# Patient Record
Sex: FEMALE | Race: WHITE
[De-identification: names, ages, dates, MRNs, and addresses within clinical notes are randomized per-mention and may not be internally consistent; named-entity substitution may affect disease eponyms.]

---

## 2017-02-14 ENCOUNTER — HOSPITAL ENCOUNTER (INPATIENT)
Dept: HOSPITAL 61 - ER | Age: 37
LOS: 3 days | Discharge: HOME | DRG: 603 | End: 2017-02-17
Attending: INTERNAL MEDICINE | Admitting: INTERNAL MEDICINE
Payer: COMMERCIAL

## 2017-02-14 VITALS — BODY MASS INDEX: 34.56 KG/M2 | HEIGHT: 65 IN | WEIGHT: 207.44 LBS

## 2017-02-14 VITALS — SYSTOLIC BLOOD PRESSURE: 144 MMHG | DIASTOLIC BLOOD PRESSURE: 89 MMHG

## 2017-02-14 VITALS — DIASTOLIC BLOOD PRESSURE: 89 MMHG | SYSTOLIC BLOOD PRESSURE: 144 MMHG

## 2017-02-14 DIAGNOSIS — K31.84: ICD-10-CM

## 2017-02-14 DIAGNOSIS — Z88.2: ICD-10-CM

## 2017-02-14 DIAGNOSIS — E11.43: ICD-10-CM

## 2017-02-14 DIAGNOSIS — Z88.8: ICD-10-CM

## 2017-02-14 DIAGNOSIS — E87.6: ICD-10-CM

## 2017-02-14 DIAGNOSIS — A46: ICD-10-CM

## 2017-02-14 DIAGNOSIS — Z88.0: ICD-10-CM

## 2017-02-14 DIAGNOSIS — L03.211: Primary | ICD-10-CM

## 2017-02-14 DIAGNOSIS — D72.829: ICD-10-CM

## 2017-02-14 DIAGNOSIS — Z82.49: ICD-10-CM

## 2017-02-14 DIAGNOSIS — I10: ICD-10-CM

## 2017-02-14 DIAGNOSIS — Z91.018: ICD-10-CM

## 2017-02-14 LAB
ANION GAP SERPL CALC-SCNC: 14 MMOL/L (ref 6–14)
BASOPHILS # BLD AUTO: 0 X10^3/UL (ref 0–0.2)
BASOPHILS NFR BLD: 0 % (ref 0–3)
BUN SERPL-MCNC: 12 MG/DL (ref 7–20)
CALCIUM SERPL-MCNC: 9.2 MG/DL (ref 8.5–10.1)
CHLORIDE SERPL-SCNC: 97 MMOL/L (ref 98–107)
CO2 SERPL-SCNC: 26 MMOL/L (ref 21–32)
CREAT SERPL-MCNC: 0.8 MG/DL (ref 0.6–1)
EOSINOPHIL NFR BLD: 2 % (ref 0–3)
ERYTHROCYTE [DISTWIDTH] IN BLOOD BY AUTOMATED COUNT: 13 % (ref 11.5–14.5)
GFR SERPLBLD BASED ON 1.73 SQ M-ARVRAT: 80.3 ML/MIN
GLUCOSE SERPL-MCNC: 133 MG/DL (ref 70–99)
HCT VFR BLD CALC: 43.6 % (ref 36–47)
HGB BLD-MCNC: 14.3 G/DL (ref 12–15.5)
LYMPHOCYTES # BLD: 2.7 X10^3/UL (ref 1–4.8)
LYMPHOCYTES NFR BLD AUTO: 24 % (ref 24–48)
MCH RBC QN AUTO: 27 PG (ref 25–35)
MCHC RBC AUTO-ENTMCNC: 33 G/DL (ref 31–37)
MCV RBC AUTO: 83 FL (ref 79–100)
MONOCYTES NFR BLD: 6 % (ref 0–9)
NEUTROPHILS NFR BLD AUTO: 69 % (ref 31–73)
PLATELET # BLD AUTO: 389 X10^3/UL (ref 140–400)
POTASSIUM SERPL-SCNC: 3.2 MMOL/L (ref 3.5–5.1)
RBC # BLD AUTO: 5.28 X10^6/UL (ref 3.5–5.4)
SODIUM SERPL-SCNC: 137 MMOL/L (ref 136–145)
WBC # BLD AUTO: 11.4 X10^3/UL (ref 4–11)

## 2017-02-14 PROCEDURE — A9585 GADOBUTROL INJECTION: HCPCS

## 2017-02-14 PROCEDURE — 36415 COLL VENOUS BLD VENIPUNCTURE: CPT

## 2017-02-14 PROCEDURE — 85027 COMPLETE CBC AUTOMATED: CPT

## 2017-02-14 PROCEDURE — 80048 BASIC METABOLIC PNL TOTAL CA: CPT

## 2017-02-14 PROCEDURE — 85651 RBC SED RATE NONAUTOMATED: CPT

## 2017-02-14 PROCEDURE — 82947 ASSAY GLUCOSE BLOOD QUANT: CPT

## 2017-02-14 PROCEDURE — 86141 C-REACTIVE PROTEIN HS: CPT

## 2017-02-14 PROCEDURE — 96365 THER/PROPH/DIAG IV INF INIT: CPT

## 2017-02-14 PROCEDURE — 70543 MRI ORBT/FAC/NCK W/O &W/DYE: CPT

## 2017-02-14 RX ADMIN — METFORMIN HYDROCHLORIDE SCH MG: 1000 TABLET, FILM COATED ORAL at 21:00

## 2017-02-14 RX ADMIN — MEROPENEM SCH MLS/HR: 500 INJECTION, POWDER, FOR SOLUTION INTRAVENOUS at 21:36

## 2017-02-14 RX ADMIN — GABAPENTIN SCH MG: 300 CAPSULE ORAL at 21:37

## 2017-02-14 RX ADMIN — HYDROXYZINE PAMOATE SCH MG: 25 CAPSULE ORAL at 21:37

## 2017-02-14 RX ADMIN — HYDROXYZINE PAMOATE SCH MG: 25 CAPSULE ORAL at 21:00

## 2017-02-14 NOTE — PDOC1
History and Physical


Date of Admission


Date of Admission


17





Identification/Chief Complaint


Chief Complaint


facial redness


Problems:  





Source


Source:  Chart review, Patient





History of Present Illness


History of Present Illness








Patient is a 36  year old female who presents with cellulitis  to ER for 16days.


Pt came to ER before ,for same reason, also fu with PCP dr. Dyson and Derm dr. Tadeo in the past 2 weeks. Was given keflex for 7days, then clinda, then 

rocephin IM, currently on doxy for 2 days, also took prednisone for 5days , 

felt good with it for 2 days.


Denies fever, chills, cough sob.


She was also started with gabapentin for 2 days, feels the facial pain is 

slightly better. However, she still feels pretty much pain, and redness never 

better, so came today. Also tried multiple local treatment wo improvement.


pt works in ER as nurse manager.





Past Medical History


Cardiovascular:  HTN


GI:  Other


Renal/:  Other


Endocrine:  Diabetes





Past Surgical History


Past Surgical History:  , Other





Family History


Family History:  Cancer, Hypertension





Social History


Smoke:  No


ALCOHOL:  none


Drugs:  None





Current Problem List


Problem List


 Problems


Medical Problems:


(1) Cellulitis


Status: Acute  











Current Medications


Current Medications





 Current Medications








 Medications


  (Trade)  Dose


 Ordered  Sig/Kerwin  Start Time


 Stop Time Status Last Admin


Dose Admin


 


 Sodium Chloride


  (Iv Sodium


 Chloride 0.9%


 1000ml Bag)  1,000 ml @ 


 100 mls/hr  1X  ONCE  17 19:00


 2/15/17 04:59  17 19:14


100 MLS/HR


 


 Vancomycin HCl


 1.75 gm/Sodium


 Chloride  500 ml @ 


 250 mls/hr  1X  ONCE  17 19:00


 17 20:59  17 19:12


250 MLS/HR











Allergies


Allergies





 Allergies








Coded Allergies Type Severity Reaction Last Updated Verified


 


  Sulfa (Sulfonamide Antibiotics) Allergy Intermediate  14 No


 


  piperacillin Allergy Intermediate HIVES 2/3/17 Yes


 


  strawberry Allergy Intermediate  14 No


 


  tazobactam Allergy Intermediate HIVES 2/3/17 Yes











ROS


Review of System


CONSTITUTIONAL:        No fever or chills


EYES:                          No recent changes


SKIN:               No rash or itching


CARDIOVASCULAR:     No chest pain, syncope, palpitations, or edema


RESPIRATORY:            No SOB or cough


GASTROINTESTINAL:    No nausea, vomiting or abdominal pain


NEUROLOGICAL:          No headaches or weakness


ENDOCRINE:               No cold or heat intolerance


GENITOURINARY:         No urgency or frequency of urination


MUSCULOSKELETAL:   No back pain or joint pain


LYMPHATICS:               No enlarged lymph nodes


PSYCHIATRIC:              No anxiety or depression





Physical Exam


Physical Exam


GEN.:    No apparent distress.  Alert and oriented.


HEENT:    Head is normocephalic, atraumatic


NECK:    Supple.  


LUNGS:    Clear to auscultation.


HEART:    RRR, S1, S2 present.  Peripheral pulses intact


ABDOMEN:    Soft, nontender.  Positive bowel sounds.


EXTREMITIES:    Without any cyanosis.    


NEUROLOGIC:     Normal speech, normal tone


PSYCHIATRIC:    Normal affect, normal mood.


SKIN:   middle face including the nose is bright erythematous, with tenderness. 

no exudate now, but said has it before.





Vitals


Vitals





 Vital Signs








  Date Time  Temp Pulse Resp B/P Pulse Ox O2 Delivery O2 Flow Rate FiO2


 


17 18:29  100 20 162/97 98 Room Air  











Labs


Labs





Laboratory Tests








Test


  17


19:00


 


White Blood Count


  11.4x10^3/uL


(4.0-11.0)


 


Red Blood Count


  5.28x10^6/uL


(3.50-5.40)


 


Hemoglobin


  14.3g/dL


(12.0-15.5)


 


Hematocrit


  43.6%


(36.0-47.0)


 


Mean Corpuscular Volume 83fL () 


 


Mean Corpuscular Hemoglobin 27pg (25-35) 


 


Mean Corpuscular Hemoglobin


Concent 33g/dL (31-37) 


 


 


Red Cell Distribution Width


  13.0%


(11.5-14.5)


 


Platelet Count


  389x10^3/uL


(140-400)


 


Neutrophils (%) (Auto) 69% (31-73) 


 


Lymphocytes (%) (Auto) 24% (24-48) 


 


Monocytes (%) (Auto) 6% (0-9) 


 


Eosinophils (%) (Auto) 2% (0-3) 


 


Basophils (%) (Auto) 0% (0-3) 


 


Neutrophils # (Auto)


  7.8x10^3uL


(1.8-7.7)


 


Lymphocytes # (Auto)


  2.7x10^3/uL


(1.0-4.8)


 


Monocytes # (Auto)


  0.7x10^3/uL


(0.0-1.1)


 


Eosinophils # (Auto)


  0.2x10^3/uL


(0.0-0.7)


 


Basophils # (Auto)


  0.0x10^3/uL


(0.0-0.2)








Laboratory Tests








Test


  17


19:00


 


White Blood Count


  11.4x10^3/uL


(4.0-11.0)


 


Red Blood Count


  5.28x10^6/uL


(3.50-5.40)


 


Hemoglobin


  14.3g/dL


(12.0-15.5)


 


Hematocrit


  43.6%


(36.0-47.0)


 


Mean Corpuscular Volume 83fL () 


 


Mean Corpuscular Hemoglobin 27pg (25-35) 


 


Mean Corpuscular Hemoglobin


Concent 33g/dL (31-37) 


 


 


Red Cell Distribution Width


  13.0%


(11.5-14.5)


 


Platelet Count


  389x10^3/uL


(140-400)


 


Neutrophils (%) (Auto) 69% (31-73) 


 


Lymphocytes (%) (Auto) 24% (24-48) 


 


Monocytes (%) (Auto) 6% (0-9) 


 


Eosinophils (%) (Auto) 2% (0-3) 


 


Basophils (%) (Auto) 0% (0-3) 


 


Neutrophils # (Auto)


  7.8x10^3uL


(1.8-7.7)


 


Lymphocytes # (Auto)


  2.7x10^3/uL


(1.0-4.8)


 


Monocytes # (Auto)


  0.7x10^3/uL


(0.0-1.1)


 


Eosinophils # (Auto)


  0.2x10^3/uL


(0.0-0.7)


 


Basophils # (Auto)


  0.0x10^3/uL


(0.0-0.2)











VTE Prophylaxis Ordered


VTE Prophylaxis Devices:  Yes


VTE Pharmacological Prophylaxi:  Yes





Assessment/Plan


Assessment/Plan








1. facial redness,   2/2 cellulitis/erysipelas


2. dm2


3. HTN


4. gastroparesis





 plan:


1. will get ID consult


2. will start with vanco and meropenum for now


3. cont home meds, SSI,slightly decrease lantus to 30u qhs


dvt ppx








RAMY SERRANO MD 2017 19:38

## 2017-02-14 NOTE — ACF
Admission Forms Criteria


                                                                               

          


                            CELLULITIS





Clinical Indications for Admission to Inpatient Care





                                                 (Place 'X' for any and all 

applicable criteria):





Admission is indicated for ANY ONE of the following(1)(2)(3)(4)(5):


[ ]I.   Limb-threatening infection


[ ]II.  High-risk comorbid condition as indicated by ANY ONE of the following:


        [ ]a)   Uncontrolled diabetes (eg, HbA1c greater than 10% (0.1))    


        [ ]b)   Cirrhosis


        [ ]c)   Neutropenia   


        [ ]d)   Asplenia     


        [ ]e)   Immunosuppression    


        [ ]f)    Symptomatic heart failure


[ ]III. Failure of outpatient therapy as indicated by ALL of the following:


        [ ]a)   Progression or no improvement after adequate trial (minimum of 

48 hours, with longer period


                 for stable lower extremity infection)


        [ ]b)   Adequate antibiotic regimen as indicated by use of ANY ONE of 

the following:


                 [ ]i)     First-generation cephalosporin (e.g., cephalexin)


                 [ ]ii)    Antistaphylococcal penicillin (e.g., dicloxacillin)


                 [ ]iii)   Penicillin-allergic patient regimen (clindamycin, 

extended-spectrum   fluoroquinolone, or doxycycline)


                 [ ]iv)   Resistant organism (eg, methicillin-resistant 

Staphylococcus aureus) regimen (6)


        [ ]c)   Outpatient intravenous therapy regimen is not appropriate due 

to ANY ONE of the following. (7)(8)(9)(10):


                 [ ]i)    It was tried and was not successful (eg, progression 

of infection). 


                 [ ]ii)   It is not available or cannot be arranged in a 

clinically appropriate time frame (e.g., the next day). 


                 [ ]iii)  Clinical presentation (eg, acuity of infection, 

rapidity of progression, confirmed or suspected bacteremia)


                         is judged to require ALL of the following:


                          [ ]1)    Immediate initiation of intravenous therapy (

eg, cannot wait for next day) 


                          [ ]2)    Intensity of patient monitoring and 

observation (eg, vital sign measurement,  checks for infection progression) 


                                    that cannot be provided at other   than 

inpatient level of care


[ ]IV.   Mental status changes


[ ]V.    Bacteremia


[ ]VI.   Hemodynamic instability


[ ]VII.  Suspected necrotizing soft tissue infection (e.g., gas in tissue)(11)(

12)


[ ]VIII.  Orbital infection (13)(14)


[ ]IX.    Associated surgical procedure (e.g., abscess drainage, debridement) 

not amenable to outpatient, emergency department, or observation care


[ ]X.     Cutaneous gangrene


[ ]XI.    High fever (temperature greater than 39.5 degrees C (103.1 degrees F) 

(oral)) not responsive to outpatient, 


          emergency department, or observation care therapy


[X]XIII.  Inpatient admission required rather than observation care (Also use 

Cellulitis: Observation Care as appropriate) because of ANY ONE of the following

:   


          [ ]a)   Periorbital or perineal infection that is severe or worsening


          [ ]b)   Severe pain requiring acute inpatient management


          [ ]c)   IV fluid to replace significant ongoing (e.g., for over 24 

hours) losses (greater than 3L/m2 per day)


          [ ]d)   Compartment syndrome monitoring (17)


          [ ]e)   Strict or protective (eg, laminar flow) isolation


          [ ]f)    Urgent debridement or skin grafting


          [ ]g)   Bone or joint debridement


          [ ]h)   Immediate inpatient surgery


          [X]i)    Other condition, treatment or monitoring requiring inpatient

  admission








Extended stay beyond goal length of stay may be needed for (1)(18):


 [ ]a)    Necrotizing soft tissue infection or fasciitis 


 [ ]b)    Gram-negative infection


 [ ]c)    Methicillin-resistant Staphylococcal aureus (MRSA) infection 


 [ ]d)    Peripheral venous insufficiency with cellulitis


 [ ]e)    Extensive edema


 [ ]f)     Sepsis or continued Hemodynamic instability


 [ ]g)    Continued high fever or mental status change


 [ ]h)    Bacteremia


 [ ]i)     Active serious comorbid conditions ( eg,  heart failure, renal 

insufficiency)            





       


                                             


The original [employer] content created by [employer] has been revised. 


The portions of the content which have been revised are identified through the 

use of italic text or in bold, and [employer] 


has neither reviewed nor approved the modified material. All other unmodified 

content is copyright  [employer] [facility]





Please see references footnoted in the original [facility] edition 

2016


Admission Criteria Met?:  Yes








JOSHUA GRAY Feb 14, 2017 23:17

## 2017-02-14 NOTE — PHYS DOC
Past Medical History


Past Medical History:  Diabetes-Type II, Hypertension


Additional Past Medical Histor:  Gastroparesis


Past Surgical History:  


Additional Past Surgical Histo:  Laparoscopy, D&C


Alcohol Use:  None


Drug Use:  None





Adult General


Chief Complaint


Chief Complaint:  CELLULITIS





HPI


HPI


This is a 36-year-old female with known history of diabetes that's had an 

ongoing facial cellulitis that has not resolved despite taking multiple 

antibiotics. Patient states she has finished a course of Keflex and Clindamycin 

as well as several IM injections of Rocephin. Patient additionally states she 

is on day 2 of a course of doxycycline but states the redness and pain to her 

face has worsened. She additionally was seen by dermatologist and had wound 

cultures that were negative for staph and showed only RBCs. She denies any 

fever or chills. She denies any nausea or vomiting. She denies any significant 

headache. She additionally states she recently had a CT of her maxillofacial 

area that did not demonstrate any significant findings other than her ongoing 

cellulitis.





Review of Systems


Review of Systems





Constitutional: Denies fever or chills []


Eyes: Denies change in visual acuity, redness, or eye pain []


HENT: Denies nasal congestion or sore throat []


Respiratory: Denies cough or shortness of breath []


Cardiovascular: No additional information not addressed in HPI []


GI: Denies abdominal pain, nausea, vomiting, bloody stools or diarrhea []


: Denies dysuria or hematuria []


Musculoskeletal: Denies back pain or joint pain []


Integument: Denies rash or skin lesions []


Neurologic: Denies headache, focal weakness or sensory changes []


Endocrine: Denies polyuria or polydipsia []





Allergies


Allergies





 Allergies








Coded Allergies Type Severity Reaction Last Updated Verified


 


  Sulfa (Sulfonamide Antibiotics) Allergy Intermediate  14 No


 


  piperacillin Allergy Intermediate HIVES 2/3/17 Yes


 


  strawberry Allergy Intermediate  14 No


 


  tazobactam Allergy Intermediate HIVES 2/3/17 Yes











Physical Exam


Physical Exam





Constitutional: Well developed, well nourished, no acute distress, non-toxic 

appearance. []


HENT: Normocephalic, large amount of redness and tenderness to palpation of the 

central portion of the face, bilateral external ears normal, oropharynx moist, 

no oral exudates, nose normal. []


Eyes: PERRLA, EOMI, conjunctiva normal, no discharge. [] 


Neck: Normal range of motion, no tenderness, supple, no stridor. [] 


Cardiovascular:Heart rate regular rhythm, no murmur []


Lungs & Thorax:  Bilateral breath sounds clear to auscultation []


Abdomen: Bowel sounds normal, soft, no tenderness, no masses, no pulsatile 

masses. [] 


Skin: Warm, dry, no erythema, no rash. [] 


Back: No tenderness, no CVA tenderness. [] 


Extremities: No tenderness, no cyanosis, no clubbing, ROM intact, no edema. [] 


Neurologic: Alert and oriented X 3, normal motor function, normal sensory 

function, no focal deficits noted. []


Psychologic: Affect normal, judgement normal, mood normal. []





Current Patient Data


Vital Signs





 Vital Signs








  Date Time  Temp Pulse Resp B/P Pulse Ox O2 Delivery O2 Flow Rate FiO2


 


17 18:29  100 20 162/97 98 Room Air  











EKG


EKG


[]





Radiology/Procedures


Radiology/Procedures


[]





Course & Med Decision Making


Course & Med Decision Making


Pertinent Labs and Imaging studies reviewed. (See chart for details)





This 28-year-old female with ongoing facial cellulitis will be admitted to the 

hospital with a infectious disease consult. Her bloodwork reveals a slightly 

elevated white count of 11 but no other acute findings. I ordered the patient a 

dose of IV vancomycin and discuss the need for admission with the hospitalist, 

Dr. Birmingham, who agreed to accept the patient with infectious disease consult. Her 

infection is consistent with erisepylas that is resistant to multiple oral 

antibiotics.





Dragon Disclaimer


Dragon Disclaimer


This electronic medical record was generated, in whole or in part, using a 

voice recognition dictation system.





Departure


Departure


Impression:  


 Primary Impression:  


 Facial cellulitis


Disposition:   ADMITTED AS INPATIENT


Admitting Physician:  Saray Birmingham


Condition:  STABLE


Referrals:  


BHARATH HART MD (PCP)








RAQUEL SPRINGER DO 2017 19:34

## 2017-02-15 VITALS — DIASTOLIC BLOOD PRESSURE: 79 MMHG | SYSTOLIC BLOOD PRESSURE: 139 MMHG

## 2017-02-15 VITALS — DIASTOLIC BLOOD PRESSURE: 98 MMHG | SYSTOLIC BLOOD PRESSURE: 124 MMHG

## 2017-02-15 VITALS — DIASTOLIC BLOOD PRESSURE: 85 MMHG | SYSTOLIC BLOOD PRESSURE: 124 MMHG

## 2017-02-15 VITALS — SYSTOLIC BLOOD PRESSURE: 133 MMHG | DIASTOLIC BLOOD PRESSURE: 88 MMHG

## 2017-02-15 VITALS — DIASTOLIC BLOOD PRESSURE: 77 MMHG | SYSTOLIC BLOOD PRESSURE: 137 MMHG

## 2017-02-15 VITALS — SYSTOLIC BLOOD PRESSURE: 134 MMHG | DIASTOLIC BLOOD PRESSURE: 91 MMHG

## 2017-02-15 LAB
ANION GAP SERPL CALC-SCNC: 12 MMOL/L (ref 6–14)
BASOPHILS # BLD AUTO: 0.1 X10^3/UL (ref 0–0.2)
BASOPHILS NFR BLD: 1 % (ref 0–3)
BUN SERPL-MCNC: 9 MG/DL (ref 7–20)
CALCIUM SERPL-MCNC: 8.4 MG/DL (ref 8.5–10.1)
CHLORIDE SERPL-SCNC: 103 MMOL/L (ref 98–107)
CO2 SERPL-SCNC: 25 MMOL/L (ref 21–32)
CREAT SERPL-MCNC: 0.7 MG/DL (ref 0.6–1)
EOSINOPHIL NFR BLD: 2 % (ref 0–3)
ERYTHROCYTE [DISTWIDTH] IN BLOOD BY AUTOMATED COUNT: 13.3 % (ref 11.5–14.5)
GFR SERPLBLD BASED ON 1.73 SQ M-ARVRAT: 93.6 ML/MIN
GLUCOSE SERPL-MCNC: 109 MG/DL (ref 70–99)
HCT VFR BLD CALC: 38.1 % (ref 36–47)
HGB BLD-MCNC: 12.7 G/DL (ref 12–15.5)
LYMPHOCYTES # BLD: 3 X10^3/UL (ref 1–4.8)
LYMPHOCYTES NFR BLD AUTO: 29 % (ref 24–48)
MCH RBC QN AUTO: 28 PG (ref 25–35)
MCHC RBC AUTO-ENTMCNC: 33 G/DL (ref 31–37)
MCV RBC AUTO: 83 FL (ref 79–100)
MONOCYTES NFR BLD: 8 % (ref 0–9)
NEUTROPHILS NFR BLD AUTO: 60 % (ref 31–73)
PLATELET # BLD AUTO: 319 X10^3/UL (ref 140–400)
POTASSIUM SERPL-SCNC: 3 MMOL/L (ref 3.5–5.1)
RBC # BLD AUTO: 4.61 X10^6/UL (ref 3.5–5.4)
SODIUM SERPL-SCNC: 140 MMOL/L (ref 136–145)
WBC # BLD AUTO: 10.4 X10^3/UL (ref 4–11)

## 2017-02-15 RX ADMIN — METFORMIN HYDROCHLORIDE SCH MG: 1000 TABLET, FILM COATED ORAL at 17:47

## 2017-02-15 RX ADMIN — HYDROXYZINE PAMOATE SCH MG: 25 CAPSULE ORAL at 19:53

## 2017-02-15 RX ADMIN — METFORMIN HYDROCHLORIDE SCH MG: 1000 TABLET, FILM COATED ORAL at 08:50

## 2017-02-15 RX ADMIN — MEROPENEM SCH MLS/HR: 500 INJECTION, POWDER, FOR SOLUTION INTRAVENOUS at 23:11

## 2017-02-15 RX ADMIN — BACITRACIN SCH MLS/HR: 5000 INJECTION, POWDER, FOR SOLUTION INTRAMUSCULAR at 05:35

## 2017-02-15 RX ADMIN — MEROPENEM SCH MLS/HR: 500 INJECTION, POWDER, FOR SOLUTION INTRAVENOUS at 05:25

## 2017-02-15 RX ADMIN — INSULIN ASPART SCH UNITS: 100 INJECTION, SOLUTION INTRAVENOUS; SUBCUTANEOUS at 12:00

## 2017-02-15 RX ADMIN — MEROPENEM SCH MLS/HR: 500 INJECTION, POWDER, FOR SOLUTION INTRAVENOUS at 14:41

## 2017-02-15 RX ADMIN — FLUCONAZOLE SCH MG: 100 TABLET ORAL at 11:11

## 2017-02-15 RX ADMIN — Medication SCH MG: at 08:50

## 2017-02-15 RX ADMIN — GABAPENTIN SCH MG: 300 CAPSULE ORAL at 19:53

## 2017-02-15 RX ADMIN — INSULIN ASPART SCH UNITS: 100 INJECTION, SOLUTION INTRAVENOUS; SUBCUTANEOUS at 08:00

## 2017-02-15 RX ADMIN — BACITRACIN SCH MLS/HR: 5000 INJECTION, POWDER, FOR SOLUTION INTRAMUSCULAR at 05:29

## 2017-02-15 RX ADMIN — INSULIN ASPART SCH UNITS: 100 INJECTION, SOLUTION INTRAVENOUS; SUBCUTANEOUS at 17:00

## 2017-02-15 RX ADMIN — Medication SCH MG: at 17:47

## 2017-02-15 NOTE — PDOC
Infectious Disease Note


Vital Sign


Vital Signs





 Vital Signs








  Date Time  Temp Pulse Resp B/P Pulse Ox O2 Delivery O2 Flow Rate FiO2


 


2/15/17 08:55  80  133/88    


 


2/15/17 07:15 97.9  20  97 Room Air  





 97.9       











Labs


Lab





Laboratory Tests








Test


  2/14/17


19:00 2/14/17


21:02 2/15/17


04:16 2/15/17


07:40


 


White Blood Count


  11.4x10^3/uL


(4.0-11.0) 


  10.4x10^3/uL


(4.0-11.0) 


 


 


Red Blood Count


  5.28x10^6/uL


(3.50-5.40) 


  4.61x10^6/uL


(3.50-5.40) 


 


 


Hemoglobin


  14.3g/dL


(12.0-15.5) 


  12.7g/dL


(12.0-15.5) 


 


 


Hematocrit


  43.6%


(36.0-47.0) 


  38.1%


(36.0-47.0) 


 


 


Mean Corpuscular Volume 83fL ()   83fL ()  


 


Mean Corpuscular Hemoglobin 27pg (25-35)   28pg (25-35)  


 


Mean Corpuscular Hemoglobin


Concent 33g/dL (31-37) 


  


  33g/dL (31-37) 


  


 


 


Red Cell Distribution Width


  13.0%


(11.5-14.5) 


  13.3%


(11.5-14.5) 


 


 


Platelet Count


  389x10^3/uL


(140-400) 


  319x10^3/uL


(140-400) 


 


 


Neutrophils (%) (Auto) 69% (31-73)   60% (31-73)  


 


Lymphocytes (%) (Auto) 24% (24-48)   29% (24-48)  


 


Monocytes (%) (Auto) 6% (0-9)   8% (0-9)  


 


Eosinophils (%) (Auto) 2% (0-3)   2% (0-3)  


 


Basophils (%) (Auto) 0% (0-3)   1% (0-3)  


 


Neutrophils # (Auto)


  7.8x10^3uL


(1.8-7.7) 


  6.2x10^3uL


(1.8-7.7) 


 


 


Lymphocytes # (Auto)


  2.7x10^3/uL


(1.0-4.8) 


  3.0x10^3/uL


(1.0-4.8) 


 


 


Monocytes # (Auto)


  0.7x10^3/uL


(0.0-1.1) 


  0.8x10^3/uL


(0.0-1.1) 


 


 


Eosinophils # (Auto)


  0.2x10^3/uL


(0.0-0.7) 


  0.2x10^3/uL


(0.0-0.7) 


 


 


Basophils # (Auto)


  0.0x10^3/uL


(0.0-0.2) 


  0.1x10^3/uL


(0.0-0.2) 


 


 


Sodium Level


  137mmol/L


(136-145) 


  140mmol/L


(136-145) 


 


 


Potassium Level


  3.2mmol/L


(3.5-5.1) 


  3.0mmol/L


(3.5-5.1) 


 


 


Chloride Level


  97mmol/L


() 


  103mmol/L


() 


 


 


Carbon Dioxide Level


  26mmol/L


(21-32) 


  25mmol/L


(21-32) 


 


 


Anion Gap 14 (6-14)   12 (6-14)  


 


Blood Urea Nitrogen 12mg/dL (7-20)   9mg/dL (7-20)  


 


Creatinine


  0.8mg/dL


(0.6-1.0) 


  0.7mg/dL


(0.6-1.0) 


 


 


Estimated GFR


(Cockcroft-Gault) 80.3 


  


  93.6 


  


 


 


Glucose Level


  133mg/dL


(70-99) 


  109mg/dL


(70-99) 


 


 


Calcium Level


  9.2mg/dL


(8.5-10.1) 


  8.4mg/dL


(8.5-10.1) 


 


 


Glucose (Fingerstick)


  


  77mg/dL


(70-99) 


  99mg/dL


(70-99)











Objective


Assessment





Facial cellulitis


Leukocytosis


DM





Plan


Plan of Care


meropenem and doxy for now


Rheumatology consult to rule out Lupus








AGGIE ORELLANA MD Feb 15, 2017 10:19

## 2017-02-16 VITALS — DIASTOLIC BLOOD PRESSURE: 84 MMHG | SYSTOLIC BLOOD PRESSURE: 120 MMHG

## 2017-02-16 VITALS — SYSTOLIC BLOOD PRESSURE: 109 MMHG | DIASTOLIC BLOOD PRESSURE: 82 MMHG

## 2017-02-16 VITALS — SYSTOLIC BLOOD PRESSURE: 125 MMHG | DIASTOLIC BLOOD PRESSURE: 78 MMHG

## 2017-02-16 VITALS — SYSTOLIC BLOOD PRESSURE: 138 MMHG | DIASTOLIC BLOOD PRESSURE: 93 MMHG

## 2017-02-16 VITALS — DIASTOLIC BLOOD PRESSURE: 91 MMHG | SYSTOLIC BLOOD PRESSURE: 144 MMHG

## 2017-02-16 VITALS — SYSTOLIC BLOOD PRESSURE: 145 MMHG | DIASTOLIC BLOOD PRESSURE: 90 MMHG

## 2017-02-16 RX ADMIN — PREDNISONE SCH MG: 20 TABLET ORAL at 10:26

## 2017-02-16 RX ADMIN — GABAPENTIN SCH MG: 300 CAPSULE ORAL at 21:07

## 2017-02-16 RX ADMIN — MEROPENEM SCH MLS/HR: 500 INJECTION, POWDER, FOR SOLUTION INTRAVENOUS at 21:06

## 2017-02-16 RX ADMIN — METFORMIN HYDROCHLORIDE SCH MG: 1000 TABLET, FILM COATED ORAL at 17:11

## 2017-02-16 RX ADMIN — METOPROLOL TARTRATE SCH MG: 50 TABLET, FILM COATED ORAL at 05:42

## 2017-02-16 RX ADMIN — Medication SCH MG: at 05:00

## 2017-02-16 RX ADMIN — Medication SCH MG: at 17:12

## 2017-02-16 RX ADMIN — HYDROCHLOROTHIAZIDE SCH MG: 25 TABLET ORAL at 05:41

## 2017-02-16 RX ADMIN — HYDROCODONE BITARTRATE AND ACETAMINOPHEN PRN TAB: 5; 325 TABLET ORAL at 21:08

## 2017-02-16 RX ADMIN — Medication SCH MG: at 05:50

## 2017-02-16 RX ADMIN — HYDROCODONE BITARTRATE AND ACETAMINOPHEN PRN TAB: 5; 325 TABLET ORAL at 08:21

## 2017-02-16 RX ADMIN — INSULIN ASPART SCH UNITS: 100 INJECTION, SOLUTION INTRAVENOUS; SUBCUTANEOUS at 17:00

## 2017-02-16 RX ADMIN — INSULIN ASPART SCH UNITS: 100 INJECTION, SOLUTION INTRAVENOUS; SUBCUTANEOUS at 12:00

## 2017-02-16 RX ADMIN — HYDROXYZINE PAMOATE SCH MG: 25 CAPSULE ORAL at 17:11

## 2017-02-16 RX ADMIN — METFORMIN HYDROCHLORIDE SCH MG: 1000 TABLET, FILM COATED ORAL at 05:41

## 2017-02-16 RX ADMIN — MEROPENEM SCH MLS/HR: 500 INJECTION, POWDER, FOR SOLUTION INTRAVENOUS at 05:40

## 2017-02-16 RX ADMIN — INSULIN ASPART SCH UNITS: 100 INJECTION, SOLUTION INTRAVENOUS; SUBCUTANEOUS at 08:00

## 2017-02-16 RX ADMIN — MEROPENEM SCH MLS/HR: 500 INJECTION, POWDER, FOR SOLUTION INTRAVENOUS at 14:24

## 2017-02-16 RX ADMIN — Medication SCH EACH: at 05:00

## 2017-02-16 RX ADMIN — INSULIN DETEMIR SCH UNITS: 100 INJECTION, SOLUTION SUBCUTANEOUS at 08:19

## 2017-02-16 RX ADMIN — FLUCONAZOLE SCH MG: 100 TABLET ORAL at 08:15

## 2017-02-16 NOTE — RAD
PROCEDURE 

MRI of the face without and with contrast 2/16/2017 

 

HISTORY 

Facial pain, redness and swelling on cheeks and nose. Recurrent facial 

cellulitis. 

 

TECHNIQUE 

Unenhanced T1 weighted and fat saturated T2 weighted sagittal, axial and 

coronal images of the face and orbits were obtained. After the intravenous

administration 9 cc of Gadovist, enhanced T1 weighted fat saturated 

sagittal, axial and coronal images were obtained. 

 

FINDINGS 

Soft tissue swelling and increased signal intensity is seen within the 

subcutaneous fat on the T2 weighted images of the face inferior to the 

orbits, bilaterally along with the nose. These findings would be 

consistent with the patient's history of a cellulitis. No abnormal fluid 

collection is seen to suggest evidence of an abscess. The orbits are 

within normal limits. The paranasal sinuses are clear. No area of abnormal

signal intensity is seen involving the brain parenchyma. 

 

IMPRESSION 

Findings are seen consistent with the patient's history of a facial 

cellulitis. No abscess is seen. 

 

Electronically signed by: Marcos Pritchett MD (Feb 16, 2017 14:12:32)

## 2017-02-16 NOTE — PDOC
Infectious Disease Note


Subjective


Subjective


pt about same, no change in rash, some swelling and pain in to lips





ROS


ROS


GEN: Denies fevers, chills, sweats


HEENT: Denies blurred vision, sore throat


CV: Denies chest pain


RESP: Denies shortness of air, cough


GI: Denies n/v/d


NEURO: Denies confusion, dizziness


MSK: Denies weakness, joint pain/swelling





Vital Sign


Vital Signs





 Vital Signs








  Date Time  Temp Pulse Resp B/P Pulse Ox O2 Delivery O2 Flow Rate FiO2


 


2/16/17 08:21   16   Room Air  


 


2/16/17 07:00 97.9 74  120/84 97   





 97.9       











Physical Exam


PHYSICAL EXAM


GENERAL:  NAD, Alert


HEENT:  PERRL, OC/OP,, no change in rash


NECK:  Supple, no JVD, no LN


LUNGS:  Clear


HEART:  S1S2, no gallop, no murmur


ABD:  Soft, NT, no organomegaly, no rebound


EXT:  No edema, no cyanosis


CNS:  Alert, oriented x 3, no focal neurologic deficit


SKIN:  No rash


IV: ok





Labs


Lab





Laboratory Tests








Test


  2/15/17


11:30 2/15/17


16:40 2/15/17


21:05 2/16/17


08:11


 


Glucose (Fingerstick)


  167mg/dL


(70-99) 227mg/dL


(70-99) 174mg/dL


(70-99) 108mg/dL


(70-99)











Objective


Assessment





Facial cellulitis// likely has Lupus


Leukocytosis


DM





Plan


Plan of Care


meropenem and doxy for now





d/w dr Owen, check crp, sed rate, clive, and medrol dose pack


d/c AGGIE Ortez MD Feb 16, 2017 08:39

## 2017-02-16 NOTE — PDOC
PROGRESS NOTES


Chief Complaint


Chief Complaint


Facial cellulitis








ASSESSMENT AND PLAN:


1.  Facial erythema:  appreciate Dr Sherman's input.  Abx incl merrem and doxy 

started, but no clinical improvement.  MRI face with cellulitis only, no 

abscess.  D/W Dr Sherman:  suspect Lupus:  start steroids.   Rheum consult not 

available, but curbsided:  O/P F/U


2.  DM2:  welll controlled on home regimen.  cont ISS


3.  HTN:  fair control on home metoprolol.  not on ACE-I as diabetic?


4.  Diabetic gastroparesis:  reglan PRN


5.  Hypokalemia:  worse.  replete PO and IV


6.  Prophylaxis:  lovenox





Vitals


Vitals





 Vital Signs








  Date Time  Temp Pulse Resp B/P Pulse Ox O2 Delivery O2 Flow Rate FiO2


 


2/16/17 11:04 97.7 78 18 145/90 96 Room Air  





 97.7       











Physical Exam


General:  Alert, Oriented X3, Cooperative


Heart:  Regular rate


Lungs:  Clear


Abdomen:  Normal bowel sounds, Soft, No tenderness


Extremities:  No edema


Skin:  Other (erythema over malar pominences, nose and phyllum)





Labs


LABS





Laboratory Tests








Test


  2/15/17


16:40 2/15/17


21:05 2/16/17


08:11 2/16/17


09:10


 


Glucose (Fingerstick)


  227mg/dL


(70-99) 174mg/dL


(70-99) 108mg/dL


(70-99) 


 


 


Erythrocyte Sedimentation Rate    34 (0-25) 














Test


  2/16/17


10:55 


  


  


 


 


Glucose (Fingerstick)


  118mg/dL


(70-99) 


  


  


 











Review of Systems


Review of Systems


essentially unchanged, min worsening below nose.








STEPHANY LOUIE MD Feb 16, 2017 12:54

## 2017-02-17 VITALS — SYSTOLIC BLOOD PRESSURE: 125 MMHG | DIASTOLIC BLOOD PRESSURE: 86 MMHG

## 2017-02-17 VITALS — DIASTOLIC BLOOD PRESSURE: 72 MMHG | SYSTOLIC BLOOD PRESSURE: 120 MMHG

## 2017-02-17 VITALS — DIASTOLIC BLOOD PRESSURE: 91 MMHG | SYSTOLIC BLOOD PRESSURE: 122 MMHG

## 2017-02-17 VITALS — DIASTOLIC BLOOD PRESSURE: 84 MMHG | SYSTOLIC BLOOD PRESSURE: 119 MMHG

## 2017-02-17 VITALS
SYSTOLIC BLOOD PRESSURE: 128 MMHG | DIASTOLIC BLOOD PRESSURE: 89 MMHG | DIASTOLIC BLOOD PRESSURE: 89 MMHG | SYSTOLIC BLOOD PRESSURE: 128 MMHG

## 2017-02-17 RX ADMIN — Medication SCH MG: at 16:30

## 2017-02-17 RX ADMIN — HYDROCHLOROTHIAZIDE SCH MG: 25 TABLET ORAL at 04:57

## 2017-02-17 RX ADMIN — Medication SCH EACH: at 04:55

## 2017-02-17 RX ADMIN — HYDROXYZINE PAMOATE SCH MG: 25 CAPSULE ORAL at 17:00

## 2017-02-17 RX ADMIN — Medication SCH MG: at 04:55

## 2017-02-17 RX ADMIN — INSULIN ASPART SCH UNITS: 100 INJECTION, SOLUTION INTRAVENOUS; SUBCUTANEOUS at 07:51

## 2017-02-17 RX ADMIN — MEROPENEM SCH MLS/HR: 500 INJECTION, POWDER, FOR SOLUTION INTRAVENOUS at 05:01

## 2017-02-17 RX ADMIN — MEROPENEM SCH MLS/HR: 500 INJECTION, POWDER, FOR SOLUTION INTRAVENOUS at 12:29

## 2017-02-17 RX ADMIN — METFORMIN HYDROCHLORIDE SCH MG: 1000 TABLET, FILM COATED ORAL at 04:55

## 2017-02-17 RX ADMIN — PREDNISONE SCH MG: 20 TABLET ORAL at 08:33

## 2017-02-17 RX ADMIN — METOPROLOL TARTRATE SCH MG: 50 TABLET, FILM COATED ORAL at 04:54

## 2017-02-17 RX ADMIN — INSULIN ASPART SCH UNITS: 100 INJECTION, SOLUTION INTRAVENOUS; SUBCUTANEOUS at 11:24

## 2017-02-17 RX ADMIN — FLUCONAZOLE SCH MG: 100 TABLET ORAL at 08:33

## 2017-02-17 RX ADMIN — METFORMIN HYDROCHLORIDE SCH MG: 1000 TABLET, FILM COATED ORAL at 17:00

## 2017-02-17 RX ADMIN — Medication SCH MG: at 05:06

## 2017-02-17 RX ADMIN — INSULIN DETEMIR SCH UNITS: 100 INJECTION, SOLUTION SUBCUTANEOUS at 05:10

## 2017-02-17 RX ADMIN — INSULIN ASPART SCH UNITS: 100 INJECTION, SOLUTION INTRAVENOUS; SUBCUTANEOUS at 17:00

## 2017-02-17 NOTE — CONS
DATE OF CONSULTATION:  02/15/2017



REQUESTING PHYSICIAN:  Dr. Birmingham.



REASON FOR CONSULTATION:  Facial cellulitis.



HISTORY OF PRESENT ILLNESS:  This is a 36-year-old  female who has

developed facial cellulitis in November/December area where she got Keflex and

it responded and went away until it came back about 16 days ago and now she has

been going through the third or fourth round of antibiotics including Keflex,

Rocephin, clindamycin and doxycycline and with no improvement, hence she is

admitted.  The patient also has been to Dr. Tadeo and there was no help.  Lupus

has been ruled out by Dr. Dyson.  The patient is now started on vancomycin and

meropenem and consult has been requested.  The patient denies any fever, denies

any nausea, vomiting, diarrhea.  The patient had a CAT scan of the maxillofacial

done, which was negative.  The patient is diabetic with reasonably good control.

 The patient denies any ketoacidosis lately.



PAST MEDICAL HISTORY:  Positive for hypertension and diabetes, also has had

 in the past.



SOCIAL HISTORY:  Negative for smoking, alcohol, illicit drug use.



ALLERGIES:  LISTED AS ALLERGIC TO SULFA.  THE PATIENT IS LISTED ALSO AS

PIPERACILLIN AND TAZOBACTAM AS EVIDENTLY GIVING IT TO THE PATIENT, ZOSYN, IT

SPILLED ON HER ARM AND SHE HAD A REDNESS, ALTHOUGH THE PATIENT HAS TAKEN

PENICILLIN WITHOUT ANY PROBLEM.



CURRENT MEDICATIONS:  Reviewed.



REVIEW OF SYSTEMS:  As per HPI, all other systems reviewed are negative.



PHYSICAL EXAMINATION:

GENERAL:  Alert, oriented female, not in distress.

VITAL SIGNS:  Stable, afebrile.

HEENT:  NAD.

NECK:  Supple, no JVP, no lymphadenopathy.

LUNGS:  Clear.

HEART:  S1, S2 regular.

ABDOMEN:  Benign.

EXTREMITIES:  No edema or cyanosis.

SKIN:  The patient does have a plastic malar rash on the face.  There is some

induration to it.  There is no necrosis.  There is no pus pointing.  There is no

abscess and at least the nose what I can see through the flashlight is not

showing any ulcer or abscess.



The patient is neurologically intact.  There are no lesions in the mouth either.



LABORATORY DATA:  White count was 11.4 yesterday.  BUN and creatinine is normal.

 Urinalysis unremarkable.  She has had some cultures done, which I am not sure

was just from the skin.  It is negative.  The nose cultures were negative for

MRSA.



IMPRESSION:

1.  Facial cellulitis.  There is a classic appearance of lupus, although

evidently has been ruled out.

2.  Leukocytosis, most likely from steroids as she received a course of steroids

and in fact, it responded very quickly that it went away until steroid was gone

and in a few days, then it came back.

3.  Diabetes.

4.  Hypertension.



RECOMMENDATIONS:  I would continue meropenem, discontinue vancomycin, add

Diflucan.  The fact that she responded to steroids quickly, it may still be

lupus.  I would get Dr. Owen involved to see if seronegative lupus may be

there.  Also, diabetic patients can have Pseudomonas cellulitis, which will

target and the Mucor is a distant possibility, although I do not see any lesions

and the CT was negative.



Thank you very much, Dr. Birmingham, for giving me the opportunity to participate in

this patient's care.

 



______________________________

AGGIE ORELLANA MD



DR:  TERRI/bhavana  JOB#:  [id number] / [id number]

DD:  02/15/2017 10:24  DT:  2017 08:26

## 2017-02-17 NOTE — PDOC
PROGRESS NOTES


Chief Complaint


Chief Complaint


Facial cellulitis








ASSESSMENT AND PLAN:


1.  Facial erythema:  appreciate Dr Sherman's input.  Abx incl merrem and doxy 

started, but no clinical improvement.  MRI face with cellulitis only, no 

abscess.  D/W Dr Sherman:  suspect Lupus:  start steroids.   Rheum consult not 

available, but curbsided:  O/P F/U


2.  DM2:  welll controlled on home regimen.  cont ISS


3.  HTN:  fair control on home metoprolol.  not on ACE-I as diabetic?


4.  Diabetic gastroparesis:  reglan PRN


5.  Hypokalemia:  worse.  replete PO and IV


6.  Prophylaxis:  lovenox





Vitals


Vitals





 Vital Signs








  Date Time  Temp Pulse Resp B/P Pulse Ox O2 Delivery O2 Flow Rate FiO2


 


2/17/17 11:15 97.5 90 20 122/91 98 Room Air  





 97.5       











Physical Exam


General:  Alert, Oriented X3, Cooperative


Heart:  Regular rate


Lungs:  Clear


Abdomen:  Normal bowel sounds, Soft, No tenderness


Extremities:  No edema


Skin:  Other (erythema over malar pominences, nose and phyllum)





Labs


LABS





Laboratory Tests








Test


  2/16/17


16:44 2/16/17


21:08 2/17/17


04:56 2/17/17


11:19


 


Glucose (Fingerstick)


  236mg/dL


(70-99) 167mg/dL


(70-99) 93mg/dL


(70-99) 132mg/dL


(70-99)











Review of Systems


Review of Systems


some improvement in erythema, edema resolved





Comment


Review of Relevant


I have reviewed the following items elise (where applicable) has been applied.


Labs





Laboratory Tests








Test


  2/15/17


16:40 2/15/17


21:05 2/16/17


08:11 2/16/17


09:10


 


Glucose (Fingerstick)


  227mg/dL


(70-99) 174mg/dL


(70-99) 108mg/dL


(70-99) 


 


 


Erythrocyte Sedimentation Rate    34 (0-25) 


 


C-Reactive Protein High


Sensitivity 


  


  


  18.96mg/L


(0.00-3.00)














Test


  2/16/17


10:55 2/16/17


16:44 2/16/17


21:08 2/17/17


04:56


 


Glucose (Fingerstick)


  118mg/dL


(70-99) 236mg/dL


(70-99) 167mg/dL


(70-99) 93mg/dL


(70-99)














Test


  2/17/17


11:19 


  


  


 


 


Glucose (Fingerstick)


  132mg/dL


(70-99) 


  


  


 








Laboratory Tests








Test


  2/16/17


16:44 2/16/17


21:08 2/17/17


04:56 2/17/17


11:19


 


Glucose (Fingerstick)


  236mg/dL


(70-99) 167mg/dL


(70-99) 93mg/dL


(70-99) 132mg/dL


(70-99)








Medications





 Current Medications


Vancomycin HCl 1.75 gm/Sodium Chloride 500 ml @  250 mls/hr 1X  ONCE IV  Last 

administered on 2/14/17at 19:12;  Start 2/14/17 at 19:00;  Stop 2/14/17 at 20:59

;  Status DC


Sodium Chloride (Iv Sodium Chloride 0.9% 1000ml Bag) 1,000 ml @  100 mls/hr 1X  

ONCE IV  Last administered on 2/14/17at 19:14;  Start 2/14/17 at 19:00;  Stop 2/

15/17 at 11:21;  Status DC


Ondansetron HCl 4 mg 4 mg PRN Q8HRS  PRN IV NAUSEA/VOMITING;  Start 2/14/17 at 

19:30;  Stop 2/15/17 at 10:59;  Status DC


Sodium Chloride (Iv Sodium Chloride 0.9% 1000ml Bag) 1,000 ml @  100 mls/hr 

Q10H IV  Last administered on 2/15/17at 05:35;  Start 2/14/17 at 19:29;  Stop 2/

15/17 at 11:21;  Status DC


Acetaminophen (Tylenol) 650 mg PRN Q4HRS  PRN PO FEVER Last administered on 2/15

/17at 05:34;  Start 2/14/17 at 19:30;  Stop 2/15/17 at 10:59;  Status DC


Acetaminophen (Tylenol) 650 mg PRN Q6HRS  PRN PO FEVER;  Start 2/14/17 at 19:45


Ondansetron HCl (Zofran) 4 mg PRN Q6HRS  PRN IV NAUSEA;  Start 2/14/17 at 19:45


Acetaminophen/ Hydrocodone Bitart (Lortab 5/325) 1 tab PRN Q6HRS  PRN PO PAIN 

MILD TO MOD Last administered on 2/16/17at 21:08;  Start 2/14/17 at 19:45


Insulin Aspart (Novolog) 0-9 UNITS TIDWMEALS SQ ;  Start 2/15/17 at 08:00


Dextrose 12.5 gm PRN Q15MIN  PRN IV SEE COMMENTS;  Start 2/14/17 at 19:45


Gabapentin (Neurontin) 300 mg HS PO  Last administered on 2/16/17at 21:07;  

Start 2/14/17 at 21:00


Metformin HCl (Glucophage) 1,000 mg BIDWMEALS PO  Last administered on 2/15/

17at 08:50;  Start 2/14/17 at 21:00;  Stop 2/15/17 at 11:00;  Status DC


Metoclopramide HCl (Reglan) 10 mg PRN TID  PRN PO INDIGESTION;  Start 2/14/17 

at 19:45


Metoprolol Tartrate (Lopressor) 50 mg DAILY PO  Last administered on 2/15/17at 

08:55;  Start 2/15/17 at 09:00;  Stop 2/15/17 at 11:01;  Status DC


Acarbose (Precose) 100 mg TIDWMEALS PO ;  Start 2/15/17 at 08:00;  Stop 2/15/17 

at 10:58;  Status DC


Acetaminophen/ Aspirin/Caffeine (Excedrin Migraine) 2 tab PRN BID  PRN PO PAIN;

  Start 2/14/17 at 19:45


Cetirizine HCl (Zyrtec) 10 mg PRN DAILY  PRN PO ALLERGIES Last administered on 2

/15/17at 02:18;  Start 2/15/17 at 02:15


Cyclobenzaprine HCl (Flexeril) 5 mg PRN TID  PRN PO MUSCLE PAIN;  Start 2/14/17 

at 20:00


Non-Formulary Medication 10 mg DAILY PO  Last administered on 2/15/17at 08:51;  

Start 2/15/17 at 09:00;  Stop 2/15/17 at 11:01;  Status DC


Glipizide (Glucotrol) 10 mg BIDBFRMEAL PO  Last administered on 2/17/17at 05:06

;  Start 2/15/17 at 07:30


Hydrochlorothiazide (Hydrodiuril) 25 mg DAILY PO  Last administered on 2/15/

17at 08:55;  Start 2/15/17 at 09:00;  Stop 2/15/17 at 11:00;  Status DC


Hydroxyzine Pamoate (Vistaril) 25 mg HS PO  Last administered on 2/14/17at 21:37

;  Start 2/14/17 at 20:00;  Stop 2/15/17 at 00:17;  Status DC


Non-Formulary Medication 1 each DAILY PO  Last administered on 2/15/17at 08:52;

  Start 2/15/17 at 09:00;  Stop 2/15/17 at 11:01;  Status DC


Non-Formulary Medication 10 mg PRN DAILY  PRN PO ALLERGIES;  Start 2/14/17 at 19

:45;  Status UNV


Insulin Detemir (Levemir) 30 units QHS SQ ;  Start 2/14/17 at 21:00;  Stop 2/15/

17 at 00:18;  Status DC


Vancomycin HCl 1 each 1 each PRN DAILY  PRN MC SEE COMMENTS Last administered 

on 2/14/17at 19:53;  Start 2/14/17 at 19:45;  Stop 2/15/17 at 10:12;  Status DC


Vancomycin HCl 1.5 gm/Sodium Chloride 500 ml @  250 mls/hr Q8H IV  Last 

administered on 2/15/17at 02:21;  Start 2/15/17 at 03:00;  Stop 2/15/17 at 10:12

;  Status DC


Meropenem/Sodium Chloride (Merrem/Iv Sodium Chloride 0.9% 50ml) 50 ml @  100 mls

/hr Q8HRS IV  Last administered on 2/17/17at 12:29;  Start 2/14/17 at 22:00


Potassium Chloride (Klor-Con) 40 meq 1X  ONCE PO  Last administered on 2/14/ 17at 21:37;  Start 2/14/17 at 20:00;  Stop 2/14/17 at 20:01;  Status DC


Vancomycin HCl 1 each 1X  ONCE MC ;  Start 2/15/17 at 18:30;  Stop 2/15/17 at 18

:30;  Status DC


Hydroxyzine Pamoate (Vistaril) 25 mg DAILYWSUP PO  Last administered on 2/16/ 17at 17:11;  Start 2/15/17 at 17:00


Insulin Detemir (Levemir) 30 units DAILY SQ ;  Start 2/15/17 at 09:00;  Stop 2/

15/17 at 10:58;  Status DC


Fluconazole (Diflucan) 400 mg DAILY PO  Last administered on 2/17/17at 08:33;  

Start 2/15/17 at 11:00


Insulin Detemir (Levemir) 40 units DAILY SQ  Last administered on 2/17/17at 05:

10;  Start 2/15/17 at 10:58


Hydrochlorothiazide (Hydrodiuril) 25 mg DAILY@05 PO  Last administered on 2/17/ 17at 04:57;  Start 2/16/17 at 05:00


Metformin HCl (Glucophage) 1,000 mg BID@05,17 PO  Last administered on 2/17/ 17at 04:55;  Start 2/15/17 at 17:00


Metoprolol Tartrate (Lopressor) 50 mg DAILY@05 PO  Last administered on 2/17/ 17at 04:54;  Start 2/16/17 at 05:00


Non-Formulary Medication 10 mg DAILY@05 PO  Last administered on 2/17/17at 04:55

;  Start 2/16/17 at 05:00


Non-Formulary Medication 1 each DAILY@05 PO  Last administered on 2/17/17at 04:

55;  Start 2/16/17 at 05:00


Prednisone (Prednisone) 40 mg DAILY PO  Last administered on 2/17/17at 08:33;  

Start 2/16/17 at 09:00


Gadobutrol (Gadavist) 9 mmol 1X  ONCE IV  Last administered on 2/16/17at 11:30;

  Start 2/16/17 at 11:30;  Stop 2/16/17 at 11:31;  Status DC





Active Scripts


Active


Hydrocodone-Apap 5-325  ** (Hydrocodone Bit/Acetaminophen) 1 Each Tablet 1 Tab 

PO PRN Q6HRS PRN


Prednisone 50 Mg Tablet 1 Tab PO DAILY


Clindamycin Hcl 150 Mg Capsule 3 Cap PO TID 10 Days


Reported


Farxiga (Dapagliflozin Propanediol) 10 Mg Tablet 10 Mg PO DAILY


Doxycycline Hyclate 100 Mg Capsule 1 Cap PO BID


Hydroxyzine Hcl 25 Mg Tablet 1-2 Tab PO HS


Glipizide 10 Mg Tablet 1 Tab PO BID


Hydrochlorothiazide Tablet (Hydrochlorothiazide) 50 Mg Tablet 25 Mg PO DAILY


Metoprolol Tartrate 50 Mg Tablet 1 Tab PO DAILY


Gabapentin 300 Mg Capsule 300 Mg PO HS


Ibuprofen 400 Mg Tablet 1 Tab PO PRN Q6HRS PRN


Zyrtec (Cetirizine Hcl) 10 Mg Capsule 10 Mg PO PRN DAILY PRN


Claritin (Loratadine) 10 Mg Tablet 10 Mg PO PRN DAILY PRN


Jolessa (Levonorgestrel-Eth Estradiol) 1 Each Tbdspk.3mo 1 Each PO DAILY


Analgesic Tablet (Aspirin/Caffeine) 1 Each Tablet 2 Each PO PRN BID PRN


Reglan (Metoclopramide Hcl) 10 Mg Tablet 1 Tab PO TID PRN


Cyclobenzaprine Hcl 5 Mg Tablet 5 Mg PO TID PRN


Lantus Solostar (Insulin Glargine,Hum.rec.anlog) 100 Unit/1 Ml Insuln.pen 40 

Unit SQ DAILY


Precose (Acarbose) 100 Mg Tablet 100 Mg PO TID


Metformin Hcl 1,000 Mg Tablet 1 Tab PO BID


Vitals/I & O





 Vital Sign - Last 24 Hours








 2/16/17 2/16/17 2/16/17 2/16/17





 19:00 21:08 22:08 23:00


 


Temp 98.5   98.4





 98.5   98.4


 


Pulse 102   102


 


Resp 18 18  18


 


B/P 144/91   138/93


 


Pulse Ox 93   97


 


O2 Delivery Room Air Room Air Room Air Room Air


 


    





    





 2/17/17 2/17/17 2/17/17 2/17/17





 03:00 04:52 04:54 07:15


 


Temp 98.3   98.1





 98.3   98.1


 


Pulse 84 85 85 79


 


Resp 18   18


 


B/P 120/72 119/84 119/84 125/86


 


Pulse Ox 97   96


 


O2 Delivery Room Air Room Air  Room Air


 


    





    





 2/17/17 2/17/17  





 08:00 11:15  


 


Temp  97.5  





  97.5  


 


Pulse  90  


 


Resp  20  


 


B/P  122/91  


 


Pulse Ox  98  


 


O2 Delivery Room Air Room Air  














 Intake and Output   


 


 2/16/17 2/16/17 2/17/17





 15:00 23:00 07:00


 


Intake Total 180 ml 700 ml 240 ml


 


Balance 180 ml 700 ml 240 ml














STEPHANY LOUIE MD Feb 17, 2017 15:40

## 2017-02-17 NOTE — PDOC
Infectious Disease Note


Subjective


Subjective


feeling much better, pain and redness improving





ROS


ROS


GEN: Denies fevers, chills, sweats


HEENT: Denies blurred vision, sore throat


CV: Denies chest pain


RESP: Denies shortness of air, cough


GI: Denies n/v/d


NEURO: Denies confusion, dizziness


MSK: Denies weakness, joint pain/swelling





Vital Sign


Vital Signs





 Vital Signs








  Date Time  Temp Pulse Resp B/P Pulse Ox O2 Delivery O2 Flow Rate FiO2


 


2/17/17 07:15 98.1 79 18 125/86 96 Room Air  





 98.1       











Physical Exam


PHYSICAL EXAM


GENERAL:  NAD, Alert


HEENT:  PERRL, OC/OP, malar rash less red


NECK:  Supple, no JVD, no LN


LUNGS:  Clear


HEART:  S1S2, no gallop, no murmur


ABD:  Soft, NT, no organomegaly, no rebound


EXT:  No edema, no cyanosis


CNS:  Alert, oriented x 3, no focal neurologic deficit


SKIN:  No rash


IV: ok





Labs


Lab





Laboratory Tests








Test


  2/16/17


10:55 2/16/17


16:44 2/16/17


21:08 2/17/17


04:56


 


Glucose (Fingerstick)


  118mg/dL


(70-99) 236mg/dL


(70-99) 167mg/dL


(70-99) 93mg/dL


(70-99)











Objective


Assessment





Facial cellulitis// likely has Lupus


Leukocytosis


DM





Plan


Plan of Care


d/c ok on po medrol dose pack


f/u dr Owen on monday/tuesday


d/c antibiotics








AGGIE ORELLANA MD Feb 17, 2017 09:38

## 2017-02-18 NOTE — DS
DATE OF DISCHARGE:  02/17/2017



CHIEF COMPLAINT:  Facial cellulitis.



HOSPITAL COURSE:  The patient is a 36-year-old  woman who was admitted

for IV treatment of what appeared to be facial cellulitis, not responding to 3

different courses of antibiotics in the past.  She was seen by Dr. Sherman from

Infectious Disease who suspected that the erythema was due to possibly lupus

rather than infection.  A trial of merrem was started without any clinical

improvement.  MRI of the face did show cellulitis only, no abscess.  She was

therefore started on steroids with improvement of her symptoms within 24 hours. 

Although she had had a preliminary workup for lupus in the past and had been

negative, Rheumatology was curbsided and recommended further testing.  The 
patient

will follow up on an outpatient basis to receive results and discuss further

approach.



DISCHARGE PHYSICAL EXAMINATION:  Please refer to the note from same date.



DISCHARGE DISPOSITION:  To home.



DISCHARGE CONDITION:  Improved.



DISCHARGE DIAGNOSIS:  facial cellulitis, suspicion for lupus.



DISCHARGE MEDICATIONS:  Please refer to MAR.



DISCHARGE INSTRUCTIONS:  The patient will follow up with PCP and see

Rheumatology in the coming week.

 



______________________________

STEPHANY LOUIE MD



DR:  UR/nts  JOB#:  [id number] / [id number]

DD:  02/18/2017 14:42  DT:  02/18/2017 23:48



BHARATH Martinez MD

MTDD

## 2017-06-05 ENCOUNTER — HOSPITAL ENCOUNTER (OUTPATIENT)
Dept: HOSPITAL 61 - LAB | Age: 37
Discharge: HOME | End: 2017-06-05
Attending: SURGERY
Payer: COMMERCIAL

## 2017-06-05 DIAGNOSIS — E11.9: ICD-10-CM

## 2017-06-05 DIAGNOSIS — Z01.818: Primary | ICD-10-CM

## 2017-06-05 DIAGNOSIS — E66.01: ICD-10-CM

## 2017-06-05 DIAGNOSIS — I10: ICD-10-CM

## 2017-06-05 LAB
ALBUMIN SERPL-MCNC: 3.3 G/DL (ref 3.4–5)
ALBUMIN/GLOB SERPL: 0.7 {RATIO} (ref 1–1.7)
ALP SERPL-CCNC: 55 U/L (ref 46–116)
ALT SERPL-CCNC: 23 U/L (ref 14–59)
ANION GAP SERPL CALC-SCNC: 10 MMOL/L (ref 6–14)
AST SERPL-CCNC: 17 U/L (ref 15–37)
BASOPHILS # BLD AUTO: 0.1 X10^3/UL (ref 0–0.2)
BASOPHILS NFR BLD: 1 % (ref 0–3)
BILIRUB SERPL-MCNC: 0.6 MG/DL (ref 0.2–1)
BUN SERPL-MCNC: 16 MG/DL (ref 7–20)
BUN/CREAT SERPL: 20 (ref 6–20)
CALCIUM SERPL-MCNC: 9.3 MG/DL (ref 8.5–10.1)
CHLORIDE SERPL-SCNC: 104 MMOL/L (ref 98–107)
CO2 SERPL-SCNC: 25 MMOL/L (ref 21–32)
CREAT SERPL-MCNC: 0.8 MG/DL (ref 0.6–1)
EOSINOPHIL NFR BLD: 2 % (ref 0–3)
ERYTHROCYTE [DISTWIDTH] IN BLOOD BY AUTOMATED COUNT: 13.4 % (ref 11.5–14.5)
GFR SERPLBLD BASED ON 1.73 SQ M-ARVRAT: 80.3 ML/MIN
GLOBULIN SER-MCNC: 4.6 G/DL (ref 2.2–3.8)
GLUCOSE SERPL-MCNC: 96 MG/DL (ref 70–99)
HCT VFR BLD CALC: 38.6 % (ref 36–47)
HGB BLD-MCNC: 12.8 G/DL (ref 12–15.5)
LYMPHOCYTES # BLD: 2.9 X10^3/UL (ref 1–4.8)
LYMPHOCYTES NFR BLD AUTO: 25 % (ref 24–48)
MCH RBC QN AUTO: 28 PG (ref 25–35)
MCHC RBC AUTO-ENTMCNC: 33 G/DL (ref 31–37)
MCV RBC AUTO: 84 FL (ref 79–100)
MONOCYTES NFR BLD: 6 % (ref 0–9)
NEUTROPHILS NFR BLD AUTO: 66 % (ref 31–73)
PLATELET # BLD AUTO: 338 X10^3/UL (ref 140–400)
POTASSIUM SERPL-SCNC: 3.9 MMOL/L (ref 3.5–5.1)
PROT SERPL-MCNC: 7.9 G/DL (ref 6.4–8.2)
RBC # BLD AUTO: 4.6 X10^6/UL (ref 3.5–5.4)
SODIUM SERPL-SCNC: 139 MMOL/L (ref 136–145)
WBC # BLD AUTO: 11.6 X10^3/UL (ref 4–11)

## 2017-06-05 PROCEDURE — 93005 ELECTROCARDIOGRAM TRACING: CPT

## 2017-06-05 PROCEDURE — 36415 COLL VENOUS BLD VENIPUNCTURE: CPT

## 2017-06-05 PROCEDURE — 71020: CPT

## 2017-06-05 PROCEDURE — 83036 HEMOGLOBIN GLYCOSYLATED A1C: CPT

## 2017-06-05 PROCEDURE — 85027 COMPLETE CBC AUTOMATED: CPT

## 2017-06-05 PROCEDURE — 80053 COMPREHEN METABOLIC PANEL: CPT

## 2017-06-05 NOTE — RAD
Chest, 2 views, 6/5/2017:



History: Preop evaluation for gastric surgery, morbid obesity



Comparison is made to a study from 11/29/2014. The heart size and pulmonary

vascularity are normal. The lungs are clear. There is no evidence of pleural

fluid.



IMPRESSION: No acute cardiopulmonary abnormality is detected.

## 2017-06-05 NOTE — EKG
General acute hospital

               [address] [place] [address]

Test Date:    2017               Test Time:    14:21:32

Pat Name:     MARIBETH JAIME       Department:   

Patient ID:   [id number]           Room:          

Gender:       F                        Technician:   

:          1980               Requested By: HILARIO MARTINEZ

Order Number: [id number]            Reading MD:     

                                 Measurements

Intervals                              Axis          

Rate:         81                       P:            29

LA:           130                      QRS:          18

QRSD:         86                       T:            20

QT:           366                                    

QTc:          426                                    

                           Interpretive Statements

SINUS RHYTHM

QRS(T) CONTOUR ABNORMALITY

CONSIDER ANTEROSEPTAL MYOCARDIAL DAMAGE

POSSIBLY ABNORMAL ECG

RI6.01

No previous ECG available for comparison

## 2017-06-23 ENCOUNTER — HOSPITAL ENCOUNTER (EMERGENCY)
Dept: HOSPITAL 61 - ER | Age: 37
Discharge: HOME | End: 2017-06-23
Payer: COMMERCIAL

## 2017-06-23 VITALS
SYSTOLIC BLOOD PRESSURE: 139 MMHG | DIASTOLIC BLOOD PRESSURE: 88 MMHG | SYSTOLIC BLOOD PRESSURE: 139 MMHG | DIASTOLIC BLOOD PRESSURE: 88 MMHG | DIASTOLIC BLOOD PRESSURE: 88 MMHG | SYSTOLIC BLOOD PRESSURE: 139 MMHG

## 2017-06-23 VITALS — HEIGHT: 65 IN | BODY MASS INDEX: 35.82 KG/M2 | WEIGHT: 215 LBS

## 2017-06-23 DIAGNOSIS — B35.9: ICD-10-CM

## 2017-06-23 DIAGNOSIS — L03.116: Primary | ICD-10-CM

## 2017-06-23 DIAGNOSIS — Z88.2: ICD-10-CM

## 2017-06-23 DIAGNOSIS — Z91.018: ICD-10-CM

## 2017-06-23 DIAGNOSIS — L55.9: ICD-10-CM

## 2017-06-23 DIAGNOSIS — E11.9: ICD-10-CM

## 2017-06-23 DIAGNOSIS — I10: ICD-10-CM

## 2017-06-23 DIAGNOSIS — Z88.1: ICD-10-CM

## 2017-06-23 PROCEDURE — 99283 EMERGENCY DEPT VISIT LOW MDM: CPT

## 2017-06-23 NOTE — PHYS DOC
Past Medical History


Past Medical History:  Diabetes-Type II, Hypertension


Additional Past Medical Histor:  dermatophytosis


Past Surgical History:  


Additional Past Surgical Histo:  D & C, laparoscopy


Alcohol Use:  Rarely


Drug Use:  None





Adult General


Chief Complaint


Chief Complaint:  WOUND CHECK





HPI


HPI





Patient is a 36  year old female with history of hypertension and diabetes type 

2 who presents today with infection on the top of the left foot. Patient states 

she got sunburned on the left foot 6 days ago. Patient states a blister formed 

over the sunburned area. She states the blister opened up 2 days ago. Patient 

states she noted the area has been draining yellow/greenish discharge. Patient 

denies any fever.





Review of Systems


Review of Systems





Constitutional: Denies fever or chills []


Eyes: Denies change in visual acuity, redness, or eye pain []


HENT: Denies nasal congestion or sore throat []


Musculoskeletal: Denies back pain or joint pain []


Integument: Left infection


Neurologic: Denies headache, focal weakness or sensory changes []


Endocrine: Denies polyuria or polydipsia []





Allergies


Allergies





Allergies








Coded Allergies Type Severity Reaction Last Updated Verified


 


  Sulfa (Sulfonamide Antibiotics) Allergy Intermediate  2/15/17 Yes


 


  piperacillin Allergy Intermediate HIVES 2/3/17 Yes


 


  strawberry Allergy Intermediate  2/15/17 Yes


 


  tazobactam Allergy Intermediate HIVES 2/3/17 Yes











Physical Exam


Physical Exam





Constitutional: Well developed, well nourished, no acute distress, non-toxic 

appearance. []


HENT: Normocephalic, atraumatic, bilateral external ears normal, oropharynx 

moist, no oral exudates, nose normal. []


Eyes: PERRLA, EOMI, conjunctiva normal, no discharge. [] 


Skin: Patient has moderate erythema to the left foot consistent with sunburn. 

There is a 3 x 3 open wound over the sunburned area of the left foot. No warmth 

to the area. +3 left pedal edema, +2 right pedal edema. Sunburned noted on the 

right foot.


Back: No tenderness, no CVA tenderness. [] 


Extremities: No tenderness, no cyanosis, no clubbing, ROM intact, no edema. [] 


Neurologic: Alert and oriented X 3, normal motor function, normal sensory 

function, no focal deficits noted. []


Psychologic: Affect normal, judgement normal, mood normal. []





Current Patient Data


Vital Signs





 Vital Signs








  Date Time  Temp Pulse Resp B/P (MAP) Pulse Ox O2 Delivery O2 Flow Rate FiO2


 


17 11:12 98.4 82 20  98 Room Air  





 98.4       











EKG


EKG


[]





Radiology/Procedures


Radiology/Procedures


[]





Course & Med Decision Making


Course & Med Decision Making


Pertinent Labs and Imaging studies reviewed. (See chart for details)





Patient is in the ED with sunburn to bilateral feet and infection on the top of 

the left foot. Tetanus is up-to-date. Discharged with clindamycin for 10 days. 

Follow-up with the wound clinic as soon as she can.





Dragon Disclaimer


Dragon Disclaimer


This electronic medical record was generated, in whole or in part, using a 

voice recognition dictation system.





Departure


Departure


Impression:  


 Primary Impression:  


 Cellulitis of left foot


 Additional Impression:  


 Sunburn


Disposition:   HOME, SELF-CARE


Condition:  STABLE


Referrals:  


BHARATH HART MD (PCP)


Follow-up with your primary care doctor in one week. 





Call [facility] today at  and set up a follow up 

appointment for wound care


Patient Instructions:  Cellulitis





Additional Instructions:  


You have left foot infection, take antibiotics prescribed as ordered. Come back 

to the ED at any point symptoms worsen or you develop a fever.


Call [facility] today at  and set up a follow up 

appointment for wound care


Complete your antibiotics


Scripts


Clindamycin Hcl (CLINDAMYCIN HCL) 150 Mg Capsule


3 CAP PO TID, #90 CAP


   Prov: SARIAH MONTOYA         17





Problem Qualifiers











SARIAH MONTOYA 2017 11:34

## 2017-06-26 ENCOUNTER — HOSPITAL ENCOUNTER (OUTPATIENT)
Dept: HOSPITAL 61 - PMGWOUND | Age: 37
Discharge: HOME | End: 2017-06-26
Attending: EMERGENCY MEDICINE
Payer: COMMERCIAL

## 2017-06-26 DIAGNOSIS — E66.01: ICD-10-CM

## 2017-06-26 DIAGNOSIS — E11.9: ICD-10-CM

## 2017-06-26 DIAGNOSIS — T25.022D: Primary | ICD-10-CM

## 2017-06-26 DIAGNOSIS — I10: ICD-10-CM
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
## 2017-06-26 PROCEDURE — 99204 OFFICE O/P NEW MOD 45 MIN: CPT

## 2017-06-26 PROCEDURE — 99214 OFFICE O/P EST MOD 30 MIN: CPT

## 2017-08-01 ENCOUNTER — HOSPITAL ENCOUNTER (OUTPATIENT)
Dept: HOSPITAL 61 - LAB | Age: 37
Discharge: HOME | End: 2017-08-01
Attending: INTERNAL MEDICINE
Payer: COMMERCIAL

## 2017-08-01 DIAGNOSIS — M25.50: Primary | ICD-10-CM

## 2017-08-01 PROCEDURE — 86140 C-REACTIVE PROTEIN: CPT

## 2017-08-01 PROCEDURE — 36415 COLL VENOUS BLD VENIPUNCTURE: CPT

## 2017-08-01 PROCEDURE — 85651 RBC SED RATE NONAUTOMATED: CPT

## 2018-01-26 ENCOUNTER — HOSPITAL ENCOUNTER (OUTPATIENT)
Dept: HOSPITAL 61 - LAB | Age: 38
Discharge: HOME | End: 2018-01-26
Attending: INTERNAL MEDICINE
Payer: COMMERCIAL

## 2018-01-26 DIAGNOSIS — M35.9: Primary | ICD-10-CM

## 2018-01-26 LAB
CRP SERPL-MCNC: 20.2 MG/L (ref 0–3.3)
SEDIMENTATION RATE: 27 (ref 0–25)

## 2018-01-26 PROCEDURE — 86140 C-REACTIVE PROTEIN: CPT

## 2018-01-26 PROCEDURE — 85651 RBC SED RATE NONAUTOMATED: CPT

## 2018-01-26 PROCEDURE — 36415 COLL VENOUS BLD VENIPUNCTURE: CPT

## 2018-03-02 ENCOUNTER — HOSPITAL ENCOUNTER (OUTPATIENT)
Dept: HOSPITAL 61 - SURG | Age: 38
End: 2018-03-02
Attending: ORTHOPAEDIC SURGERY
Payer: COMMERCIAL

## 2018-03-02 DIAGNOSIS — I10: ICD-10-CM

## 2018-03-02 DIAGNOSIS — Z88.8: ICD-10-CM

## 2018-03-02 DIAGNOSIS — Z87.440: ICD-10-CM

## 2018-03-02 DIAGNOSIS — Z87.442: ICD-10-CM

## 2018-03-02 DIAGNOSIS — E11.9: ICD-10-CM

## 2018-03-02 DIAGNOSIS — Z83.3: ICD-10-CM

## 2018-03-02 DIAGNOSIS — Z88.0: ICD-10-CM

## 2018-03-02 DIAGNOSIS — M19.90: ICD-10-CM

## 2018-03-02 DIAGNOSIS — Z88.2: ICD-10-CM

## 2018-03-02 DIAGNOSIS — Z88.1: ICD-10-CM

## 2018-03-02 DIAGNOSIS — Z98.890: ICD-10-CM

## 2018-03-02 DIAGNOSIS — Z91.018: ICD-10-CM

## 2018-03-02 DIAGNOSIS — M67.431: Primary | ICD-10-CM

## 2018-03-02 DIAGNOSIS — Z82.49: ICD-10-CM

## 2018-03-02 DIAGNOSIS — Z87.891: ICD-10-CM

## 2018-03-02 LAB
NEG OBC UR: (no result)
POS OBC UR: (no result)
U PREG PATIENT: NEGATIVE

## 2018-03-02 PROCEDURE — 88304 TISSUE EXAM BY PATHOLOGIST: CPT

## 2018-03-02 PROCEDURE — 81025 URINE PREGNANCY TEST: CPT

## 2018-03-02 PROCEDURE — 25111 REMOVE WRIST TENDON LESION: CPT

## 2018-03-02 RX ADMIN — KETOROLAC TROMETHAMINE 1 MG: 30 INJECTION, SOLUTION INTRAMUSCULAR at 13:58

## 2018-03-02 RX ADMIN — BUPIVACAINE HYDROCHLORIDE AND EPINEPHRINE BITARTRATE 1 ML: 2.5; .005 INJECTION, SOLUTION INFILTRATION; PERINEURAL at 12:41

## 2018-03-02 RX ADMIN — CLINDAMYCIN PHOSPHATE 1 MLS/HR: 18 INJECTION, SOLUTION INTRAVENOUS at 12:33

## 2018-03-02 RX ADMIN — SODIUM CHLORIDE, SODIUM LACTATE, POTASSIUM CHLORIDE, AND CALCIUM CHLORIDE 1 MLS/HR: .6; .31; .03; .02 INJECTION, SOLUTION INTRAVENOUS at 11:49

## 2018-03-02 RX ADMIN — FENTANYL CITRATE 1 MCG: 50 INJECTION INTRAMUSCULAR; INTRAVENOUS at 13:37

## 2018-03-30 ENCOUNTER — HOSPITAL ENCOUNTER (OUTPATIENT)
Dept: HOSPITAL 61 - LAB | Age: 38
Discharge: HOME | End: 2018-03-30
Attending: INTERNAL MEDICINE
Payer: COMMERCIAL

## 2018-03-30 DIAGNOSIS — E11.9: Primary | ICD-10-CM

## 2018-03-30 LAB
ANION GAP SERPL CALC-SCNC: 11 MMOL/L (ref 6–14)
BLOOD UREA NITROGEN: 15 MG/DL (ref 7–20)
CALCIUM: 9.5 MG/DL (ref 8.5–10.1)
CHLORIDE: 101 MMOL/L (ref 98–107)
CHOLESTEROL/HDL RATIO: 3.1
CHOLESTEROL: 169 MG/DL (ref 0–200)
CO2 SERPL-SCNC: 27 MMOL/L (ref 21–32)
CREAT SERPL-MCNC: 0.9 MG/DL (ref 0.6–1)
GFR SERPLBLD BASED ON 1.73 SQ M-ARVRAT: 69.7 ML/MIN
GLUCOSE SERPL-MCNC: 206 MG/DL (ref 70–99)
HDLC: 55 MG/DL (ref 40–60)
LDLC: 85 MG/DL (ref 0–100)
NON-HDL CHOLESTEROL: 114 MG/DL (ref 0–129)
POTASSIUM SERPL-SCNC: 3.9 MMOL/L (ref 3.5–5.1)
SODIUM: 139 MMOL/L (ref 136–145)
TRIGLYCERIDES: 147 MG/DL (ref 0–150)
VLDLC: 29 MG/DL (ref 0–40)

## 2018-03-30 PROCEDURE — 83036 HEMOGLOBIN GLYCOSYLATED A1C: CPT

## 2018-03-30 PROCEDURE — 80048 BASIC METABOLIC PNL TOTAL CA: CPT

## 2018-03-30 PROCEDURE — 36415 COLL VENOUS BLD VENIPUNCTURE: CPT

## 2018-03-30 PROCEDURE — 80061 LIPID PANEL: CPT

## 2018-04-01 LAB — HEMOGLOBIN A1C: 7.1 % (ref 4.8–5.6)

## 2018-06-25 VITALS
SYSTOLIC BLOOD PRESSURE: 107 MMHG | DIASTOLIC BLOOD PRESSURE: 74 MMHG | DIASTOLIC BLOOD PRESSURE: 74 MMHG | SYSTOLIC BLOOD PRESSURE: 107 MMHG | SYSTOLIC BLOOD PRESSURE: 107 MMHG | DIASTOLIC BLOOD PRESSURE: 74 MMHG | DIASTOLIC BLOOD PRESSURE: 74 MMHG | SYSTOLIC BLOOD PRESSURE: 107 MMHG | DIASTOLIC BLOOD PRESSURE: 74 MMHG | SYSTOLIC BLOOD PRESSURE: 107 MMHG

## 2018-12-18 ENCOUNTER — HOSPITAL ENCOUNTER (OUTPATIENT)
Dept: HOSPITAL 61 - LAB | Age: 38
Discharge: HOME | End: 2018-12-18
Attending: INTERNAL MEDICINE
Payer: COMMERCIAL

## 2018-12-18 DIAGNOSIS — M33.10: Primary | ICD-10-CM

## 2018-12-18 DIAGNOSIS — E11.9: ICD-10-CM

## 2018-12-18 LAB
ALBUMIN SERPL-MCNC: 3.8 G/DL (ref 3.4–5)
ALBUMIN/GLOB SERPL: 0.8 {RATIO} (ref 1–1.7)
ALP SERPL-CCNC: 48 U/L (ref 46–116)
ALT SERPL-CCNC: 33 U/L (ref 14–59)
ANION GAP SERPL CALC-SCNC: 10 MMOL/L (ref 6–14)
AST SERPL-CCNC: 19 U/L (ref 15–37)
BASOPHILS # BLD AUTO: 0.1 X10^3/UL (ref 0–0.2)
BASOPHILS NFR BLD: 1 % (ref 0–3)
BILIRUB SERPL-MCNC: 0.9 MG/DL (ref 0.2–1)
BUN SERPL-MCNC: 23 MG/DL (ref 7–20)
BUN/CREAT SERPL: 26 (ref 6–20)
CALCIUM SERPL-MCNC: 9.9 MG/DL (ref 8.5–10.1)
CHLORIDE SERPL-SCNC: 100 MMOL/L (ref 98–107)
CHOLEST SERPL-MCNC: 174 MG/DL (ref 0–200)
CHOLEST/HDLC SERPL: 3.2 {RATIO}
CO2 SERPL-SCNC: 28 MMOL/L (ref 21–32)
CREAT SERPL-MCNC: 0.9 MG/DL (ref 0.6–1)
CRP SERPL-MCNC: 5.6 MG/L (ref 0–3.3)
EOSINOPHIL NFR BLD: 0 X10^3/UL (ref 0–0.7)
EOSINOPHIL NFR BLD: 1 % (ref 0–3)
ERYTHROCYTE [DISTWIDTH] IN BLOOD BY AUTOMATED COUNT: 11.7 % (ref 11.5–14.5)
GFR SERPLBLD BASED ON 1.73 SQ M-ARVRAT: 69.3 ML/MIN
GLOBULIN SER-MCNC: 4.5 G/DL (ref 2.2–3.8)
GLUCOSE SERPL-MCNC: 126 MG/DL (ref 70–99)
HCT VFR BLD CALC: 39 % (ref 36–47)
HDLC SERPL-MCNC: 55 MG/DL (ref 40–60)
HGB BLD-MCNC: 13.7 G/DL (ref 12–15.5)
LDLC: 81 MG/DL (ref 0–100)
LYMPHOCYTES # BLD: 2.8 X10^3/UL (ref 1–4.8)
LYMPHOCYTES NFR BLD AUTO: 39 % (ref 24–48)
MCH RBC QN AUTO: 31 PG (ref 25–35)
MCHC RBC AUTO-ENTMCNC: 35 G/DL (ref 31–37)
MCV RBC AUTO: 87 FL (ref 79–100)
MONO #: 0.6 X10^3/UL (ref 0–1.1)
MONOCYTES NFR BLD: 8 % (ref 0–9)
NEUT #: 3.8 X10^3UL (ref 1.8–7.7)
NEUTROPHILS NFR BLD AUTO: 52 % (ref 31–73)
PLATELET # BLD AUTO: 300 X10^3/UL (ref 140–400)
POTASSIUM SERPL-SCNC: 3.9 MMOL/L (ref 3.5–5.1)
PROT SERPL-MCNC: 8.3 G/DL (ref 6.4–8.2)
RBC # BLD AUTO: 4.49 X10^6/UL (ref 3.5–5.4)
SODIUM SERPL-SCNC: 138 MMOL/L (ref 136–145)
TRIGL SERPL-MCNC: 190 MG/DL (ref 0–150)
VLDLC: 38 MG/DL (ref 0–40)
WBC # BLD AUTO: 7.3 X10^3/UL (ref 4–11)

## 2018-12-18 PROCEDURE — 83036 HEMOGLOBIN GLYCOSYLATED A1C: CPT

## 2018-12-18 PROCEDURE — 85025 COMPLETE CBC W/AUTO DIFF WBC: CPT

## 2018-12-18 PROCEDURE — 80053 COMPREHEN METABOLIC PANEL: CPT

## 2018-12-18 PROCEDURE — 80061 LIPID PANEL: CPT

## 2018-12-18 PROCEDURE — 85651 RBC SED RATE NONAUTOMATED: CPT

## 2018-12-18 PROCEDURE — 82306 VITAMIN D 25 HYDROXY: CPT

## 2018-12-18 PROCEDURE — 82746 ASSAY OF FOLIC ACID SERUM: CPT

## 2018-12-18 PROCEDURE — 86140 C-REACTIVE PROTEIN: CPT

## 2018-12-18 PROCEDURE — 82607 VITAMIN B-12: CPT

## 2018-12-18 PROCEDURE — 82728 ASSAY OF FERRITIN: CPT

## 2018-12-19 LAB — HBA1C MFR BLD: 6.9 % (ref 4.8–5.6)

## 2019-01-02 ENCOUNTER — HOSPITAL ENCOUNTER (OUTPATIENT)
Dept: HOSPITAL 61 - LAB | Age: 39
Discharge: HOME | End: 2019-01-02
Attending: INTERNAL MEDICINE
Payer: COMMERCIAL

## 2019-01-02 DIAGNOSIS — L65.8: ICD-10-CM

## 2019-01-02 DIAGNOSIS — M18.11: Primary | ICD-10-CM

## 2019-01-02 DIAGNOSIS — L65.8: Primary | ICD-10-CM

## 2019-01-02 LAB — LKM AB SER-ACNC: 23 IU/ML (ref 0–34)

## 2019-01-02 PROCEDURE — 36415 COLL VENOUS BLD VENIPUNCTURE: CPT

## 2019-01-02 PROCEDURE — 86800 THYROGLOBULIN ANTIBODY: CPT

## 2019-01-02 PROCEDURE — 86376 MICROSOMAL ANTIBODY EACH: CPT

## 2019-01-02 PROCEDURE — 73130 X-RAY EXAM OF HAND: CPT

## 2019-01-02 NOTE — RAD
EXAM: Right hand, 3 views.

 

HISTORY: Polyarthralgia.

 

COMPARISON: None.

 

FINDINGS: 3 views of the right hand are obtained. There is first 

carpometacarpal joint space narrowing with degenerative spurring and bony 

remodeling. There are chronic fragmented spurs adjacent to the joint 

space. The remainder of the joint spaces are unremarkable. No soft tissue 

lesion is seen. There is a chronic corticated ossicle along the dorsal 

wrist at the level of the carpometacarpal joints and the lateral 

projection, likely developmental or due to a chronic nonunited fracture 

fragment.

 

IMPRESSION: 

1. Moderate to severe first carpal metacarpal osteoarthritis.

2. No acute osseous finding.

 

Electronically signed by: Izabella Flores MD (1/2/2019 11:37 AM) [id number]

## 2019-01-25 ENCOUNTER — HOSPITAL ENCOUNTER (OUTPATIENT)
Dept: HOSPITAL 61 - MAMMO | Age: 39
Discharge: HOME | End: 2019-01-25
Attending: INTERNAL MEDICINE
Payer: COMMERCIAL

## 2019-01-25 DIAGNOSIS — Z12.31: Primary | ICD-10-CM

## 2019-01-25 PROCEDURE — 77067 SCR MAMMO BI INCL CAD: CPT

## 2019-01-25 PROCEDURE — 77063 BREAST TOMOSYNTHESIS BI: CPT

## 2019-01-29 NOTE — RAD
DATE: 1/25/2019



EXAM: MAMMO KIRSTIE SCREENING BILATERAL    



HISTORY: Screening Mammogram



COMPARISON: None, this is a baseline.



This study was interpreted with the benefit of Computerized Aided Detection

(CAD).



The breast parenchyma shows scattered fibroglandular densities. Breast

parenchyma level B.



FINDINGS: Bilateral digital 2-D and 3-D tomosynthesis CC and MLO views. No

suspicious mass, calcification or architectural distortion. No significant

change from prior examination    



IMPRESSION: No mammographic evidence of malignancy. Recommend routine

screening mammogram in 12 months. 



BI-RADS CATEGORY: 1 NEGATIVE



RECOMMENDED FOLLOW-UP: 12M 12 MONTH FOLLOW-UP



PQRS compliance statement: Patient information was entered into a reminder

system with a target due date     for the next mammogram.



Mammography is a sensitive method for finding small breast cancers, but it

does not detect them all and is not a substitute for careful clinical

examination.  A negative mammogram does not negate a clinically suspicious

finding and should not result in delay in biopsying a clinically suspicious

abnormality.



"Our facility is accredited by the [employer] Mammography

Program."

## 2020-02-07 ENCOUNTER — HOSPITAL ENCOUNTER (OUTPATIENT)
Dept: HOSPITAL 61 - LAB | Age: 40
Discharge: HOME | End: 2020-02-07
Attending: INTERNAL MEDICINE
Payer: COMMERCIAL

## 2020-02-07 DIAGNOSIS — E11.9: ICD-10-CM

## 2020-02-07 DIAGNOSIS — M33.90: Primary | ICD-10-CM

## 2020-02-07 DIAGNOSIS — Z98.890: ICD-10-CM

## 2020-02-07 LAB
ALBUMIN SERPL-MCNC: 3.4 G/DL (ref 3.4–5)
ALBUMIN/GLOB SERPL: 0.9 {RATIO} (ref 1–1.7)
ALP SERPL-CCNC: 42 U/L (ref 46–116)
ALT SERPL-CCNC: 20 U/L (ref 14–59)
ANION GAP SERPL CALC-SCNC: 11 MMOL/L (ref 6–14)
AST SERPL-CCNC: 14 U/L (ref 15–37)
BASOPHILS # BLD AUTO: 0.1 X10^3/UL (ref 0–0.2)
BASOPHILS NFR BLD: 1 % (ref 0–3)
BILIRUB SERPL-MCNC: 1.1 MG/DL (ref 0.2–1)
BUN SERPL-MCNC: 15 MG/DL (ref 7–20)
BUN/CREAT SERPL: 17 (ref 6–20)
CALCIUM SERPL-MCNC: 9.1 MG/DL (ref 8.5–10.1)
CHLORIDE SERPL-SCNC: 102 MMOL/L (ref 98–107)
CHOLEST SERPL-MCNC: 168 MG/DL (ref 0–200)
CHOLEST/HDLC SERPL: 2.9 {RATIO}
CO2 SERPL-SCNC: 28 MMOL/L (ref 21–32)
CREAT SERPL-MCNC: 0.9 MG/DL (ref 0.6–1)
EOSINOPHIL NFR BLD: 0.1 X10^3/UL (ref 0–0.7)
EOSINOPHIL NFR BLD: 2 % (ref 0–3)
ERYTHROCYTE [DISTWIDTH] IN BLOOD BY AUTOMATED COUNT: 11.8 % (ref 11.5–14.5)
GFR SERPLBLD BASED ON 1.73 SQ M-ARVRAT: 69 ML/MIN
GLOBULIN SER-MCNC: 3.8 G/DL (ref 2.2–3.8)
GLUCOSE SERPL-MCNC: 130 MG/DL (ref 70–99)
HBA1C MFR BLD: 6.7 % (ref 4.8–5.6)
HCT VFR BLD CALC: 39.7 % (ref 36–47)
HDLC SERPL-MCNC: 57 MG/DL (ref 40–60)
HGB BLD-MCNC: 13.6 G/DL (ref 12–15.5)
LDLC: 82 MG/DL (ref 0–100)
LYMPHOCYTES # BLD: 1.7 X10^3/UL (ref 1–4.8)
LYMPHOCYTES NFR BLD AUTO: 30 % (ref 24–48)
MCH RBC QN AUTO: 30 PG (ref 25–35)
MCHC RBC AUTO-ENTMCNC: 34 G/DL (ref 31–37)
MCV RBC AUTO: 86 FL (ref 79–100)
MONO #: 0.4 X10^3/UL (ref 0–1.1)
MONOCYTES NFR BLD: 7 % (ref 0–9)
NEUT #: 3.5 X10^3/UL (ref 1.8–7.7)
NEUTROPHILS NFR BLD AUTO: 61 % (ref 31–73)
PLATELET # BLD AUTO: 284 X10^3/UL (ref 140–400)
POTASSIUM SERPL-SCNC: 3.9 MMOL/L (ref 3.5–5.1)
PROT SERPL-MCNC: 7.2 G/DL (ref 6.4–8.2)
RBC # BLD AUTO: 4.59 X10^6/UL (ref 3.5–5.4)
SODIUM SERPL-SCNC: 141 MMOL/L (ref 136–145)
TRIGL SERPL-MCNC: 145 MG/DL (ref 0–150)
VLDLC: 29 MG/DL (ref 0–40)
WBC # BLD AUTO: 5.7 X10^3/UL (ref 4–11)

## 2020-02-07 PROCEDURE — 85651 RBC SED RATE NONAUTOMATED: CPT

## 2020-02-07 PROCEDURE — 82746 ASSAY OF FOLIC ACID SERUM: CPT

## 2020-02-07 PROCEDURE — 83036 HEMOGLOBIN GLYCOSYLATED A1C: CPT

## 2020-02-07 PROCEDURE — 82607 VITAMIN B-12: CPT

## 2020-02-07 PROCEDURE — 82525 ASSAY OF COPPER: CPT

## 2020-02-07 PROCEDURE — 84630 ASSAY OF ZINC: CPT

## 2020-02-07 PROCEDURE — 84425 ASSAY OF VITAMIN B-1: CPT

## 2020-02-07 PROCEDURE — 82728 ASSAY OF FERRITIN: CPT

## 2020-02-07 PROCEDURE — 36415 COLL VENOUS BLD VENIPUNCTURE: CPT

## 2020-02-07 PROCEDURE — 85025 COMPLETE CBC W/AUTO DIFF WBC: CPT

## 2020-02-07 PROCEDURE — 82306 VITAMIN D 25 HYDROXY: CPT

## 2020-02-07 PROCEDURE — 80053 COMPREHEN METABOLIC PANEL: CPT

## 2020-02-07 PROCEDURE — 83921 ORGANIC ACID SINGLE QUANT: CPT

## 2020-02-07 PROCEDURE — 80061 LIPID PANEL: CPT

## 2020-02-11 LAB — METHYLMALONATE SERPL-SCNC: 90 NMOL/L (ref 0–378)

## 2020-06-22 ENCOUNTER — HOSPITAL ENCOUNTER (EMERGENCY)
Dept: HOSPITAL 61 - ER | Age: 40
Discharge: HOME | End: 2020-06-22
Payer: COMMERCIAL

## 2020-06-22 VITALS — WEIGHT: 155.21 LBS | BODY MASS INDEX: 25.86 KG/M2 | HEIGHT: 65 IN

## 2020-06-22 VITALS — SYSTOLIC BLOOD PRESSURE: 121 MMHG | DIASTOLIC BLOOD PRESSURE: 71 MMHG

## 2020-06-22 DIAGNOSIS — R21: ICD-10-CM

## 2020-06-22 DIAGNOSIS — Z88.6: ICD-10-CM
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
## 2020-06-22 DIAGNOSIS — Z87.891: ICD-10-CM

## 2020-06-22 DIAGNOSIS — I10: ICD-10-CM

## 2020-06-22 DIAGNOSIS — Z88.8: ICD-10-CM
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
## 2020-06-22 DIAGNOSIS — S00.81XA: Primary | ICD-10-CM

## 2020-06-22 DIAGNOSIS — Z88.2: ICD-10-CM

## 2020-06-22 DIAGNOSIS — L03.211: ICD-10-CM

## 2020-06-22 DIAGNOSIS — E11.9: ICD-10-CM

## 2020-06-22 DIAGNOSIS — Z91.018: ICD-10-CM

## 2020-06-22 DIAGNOSIS — L03.114: ICD-10-CM
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
## 2020-06-22 DIAGNOSIS — L53.9: ICD-10-CM

## 2020-06-22 DIAGNOSIS — Z98.890: ICD-10-CM
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
## 2020-06-22 PROCEDURE — 99283 EMERGENCY DEPT VISIT LOW MDM: CPT

## 2020-06-22 PROCEDURE — 96374 THER/PROPH/DIAG INJ IV PUSH: CPT

## 2020-06-22 NOTE — PHYS DOC
Past Medical History


Past Medical History:  Diabetes-Type II, Hypertension, Other


Additional Past Medical Histor:  DERMATOCYTOSITIS


Past Surgical History:  Other


Additional Past Surgical Histo:  GASTRIC SLEEVE, D & C, WRIST SX


Smoking Status:  Former Smoker


Alcohol Use:  Occasionally


Drug Use:  None





General Adult


EDM:


Chief Complaint:  SKIN PROBLEM





HPI:


HPI:





Patient is a 39-year-old female well-known to me presents with cellulitis of the

face and left upper arm.  She states couple days ago she was bitten and 

scratched by a cat.  She has been taking Augmentin but her rash is continuing to

get a little worse.  Especially the scratch on her left cheek.  She states she 

has been admitted for something similar in the past.  Currently she denies any 

fever.





Review of Systems:


Review of Systems:


Constitutional:   Denies fever or chills. []


Musculoskeletal:   Denies back pain or joint pain. [] 


Integument: Per HPI []





Heart Score:


Risk Factors:


Risk Factors:  DM, Current or recent (<one month) smoker, HTN, HLP, family 

history of CAD, obesity.


Risk Scores:


Score 0 - 3:  2.5% MACE over next 6 weeks - Discharge Home


Score 4 - 6:  20.3% MACE over next 6 weeks - Admit for Clinical Observation


Score 7 - 10:  72.7% MACE over next 6 weeks - Early Invasive Strategies





Current Medications:





Current Medications








 Medications


  (Trade)  Dose


 Ordered  Sig/Kerwin  Start Time


 Stop Time Status Last Admin


Dose Admin


 


 Ceftriaxone Sodium


  (Rocephin)  2 gm  1X  ONCE  6/22/20 14:00


 6/22/20 14:08 DC 6/22/20 14:21


2 GM


 


 Metronidazole  100 ml @ 


 100 mls/hr  1X  ONCE  6/22/20 14:00


 6/22/20 14:59  6/22/20 14:22


100 MLS/HR











Allergies:


Allergies:





Allergies








Coded Allergies Type Severity Reaction Last Updated Verified


 


  lisinopril Allergy Severe ANGIOEDEMA 3/2/18 Yes


 


  Sulfa (Sulfonamide Antibiotics) Allergy Intermediate  3/2/18 Yes


 


  nickel Allergy Intermediate Rash 3/2/18 Yes


 


  piperacillin Allergy Intermediate HIVES 3/2/18 Yes


 


  strawberry Allergy Intermediate  3/2/18 Yes


 


  tazobactam Allergy Intermediate HIVES 3/2/18 Yes


 


  mupirocin Allergy Unknown UNKNOWN 6/22/20 Yes











Physical Exam:


PE:





Constitutional: Well developed, well nourished, no acute distress, non-toxic 

appearance. []


HENT: See physical exam on skin, atraumatic, bilateral external ears normal, 

oropharynx moist, no oral exudates, nose normal. []


Eyes: PERRLA, EOMI, conjunctiva normal, no discharge. [] 


Skin: Linear area of erythema on the left cheek that passes from just lateral to

 the nose through the nasolabial cleft that has some vesicles and surrounding 

erythema there is a tiny pustule on the left deltoid with surrounding erythema. 

[] 


Back: No tenderness, no CVA tenderness. [] 


Extremities: No tenderness, no cyanosis, no clubbing, ROM intact, no edema. [] 


Psychologic: Affect normal, judgement normal, mood normal. []





Current Patient Data:


Vital Signs:





                                   Vital Signs








  Date Time  Temp Pulse Resp B/P (MAP) Pulse Ox O2 Delivery O2 Flow Rate FiO2


 


6/22/20 13:20 98.0 78 19 119/79 (92) 97 Room Air  





 98.0       











EKG:


EKG:


[]





Radiology/Procedures:


Radiology/Procedures:


[]





Course & Med Decision Making:


Course & Med Decision Making


Pertinent Labs and Imaging studies reviewed. (See chart for details)





[]





Dragon Disclaimer:


Dragon Disclaimer:


This electronic medical record was generated, in whole or in part, using a voice

 recognition dictation system.





Departure


Departure


Impression:  


   Primary Impression:  


   Cat bite


   Qualified Codes:  W55.01XA - Bitten by cat, initial encounter


   Additional Impression:  


   Cat scratch


Disposition:  01 HOME, SELF-CARE


Condition:  STABLE


Referrals:  


BHARATH HART MD (PCP)


Patient Instructions:  Animal Bite





Additional Instructions:  


Come back to the emergency department tomorrow for another shot of ceftriaxone 

if the redness is no better


Scripts


Dexamethasone (Decadron) 4 Mg Tablet


1 TAB PO BID for 3 Days, #6 TAB 0 Refills


   Prov: ELO CHAND DO         6/22/20 


Metronidazole (FLAGYL) 500 Mg Tablet


500 MG PO TID for cat bite/scratch, #30 TAB


   Prov: ELO CHAND DO         6/22/20





Justicifation of Admission Dx:


Justifications for Admission:


Justification of Admission Dx:  No











ELO CHAND DO             Jun 22, 2020 14:56

## 2020-06-23 ENCOUNTER — HOSPITAL ENCOUNTER (EMERGENCY)
Dept: HOSPITAL 61 - ER | Age: 40
Discharge: HOME | End: 2020-06-23
Payer: COMMERCIAL

## 2020-06-23 VITALS
DIASTOLIC BLOOD PRESSURE: 81 MMHG | SYSTOLIC BLOOD PRESSURE: 133 MMHG | SYSTOLIC BLOOD PRESSURE: 133 MMHG | DIASTOLIC BLOOD PRESSURE: 81 MMHG | SYSTOLIC BLOOD PRESSURE: 133 MMHG | DIASTOLIC BLOOD PRESSURE: 81 MMHG | SYSTOLIC BLOOD PRESSURE: 133 MMHG | DIASTOLIC BLOOD PRESSURE: 81 MMHG

## 2020-06-23 VITALS — WEIGHT: 155.21 LBS | HEIGHT: 65 IN | BODY MASS INDEX: 25.86 KG/M2

## 2020-06-23 DIAGNOSIS — L53.8: ICD-10-CM
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
## 2020-06-23 DIAGNOSIS — Z88.1: ICD-10-CM
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
## 2020-06-23 DIAGNOSIS — I10: ICD-10-CM

## 2020-06-23 DIAGNOSIS — Z88.2: ICD-10-CM
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
## 2020-06-23 DIAGNOSIS — Z87.891: ICD-10-CM

## 2020-06-23 DIAGNOSIS — S41.002A: Primary | ICD-10-CM

## 2020-06-23 DIAGNOSIS — E11.65: ICD-10-CM

## 2020-06-23 DIAGNOSIS — Z91.018: ICD-10-CM

## 2020-06-23 DIAGNOSIS — Z88.8: ICD-10-CM
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
## 2020-06-23 PROCEDURE — 99283 EMERGENCY DEPT VISIT LOW MDM: CPT

## 2020-06-23 PROCEDURE — 96374 THER/PROPH/DIAG INJ IV PUSH: CPT

## 2020-06-23 NOTE — PHYS DOC
Past Medical History


Past Medical History:  Diabetes-Type II, Hypertension, Other


Additional Past Medical Histor:  DERMATOCYTOSITIS


Past Surgical History:  Other


Additional Past Surgical Histo:  GASTRIC SLEEVE, D & C, WRIST SX


Smoking Status:  Former Smoker


Additional Information:  


quit smoking 2011


Alcohol Use:  Occasionally


Drug Use:  None





General Adult


EDM:


Chief Complaint:  CELLULITIS





HPI:


HPI:





Patient is a 39-year-old female who returns today for an IV antibiotic injection

after she has been scratched and bitten by a cat.  She was started on Augmentin 

has been taking Flagyl p.o. received IV Rocephin yesterday and another injection

was due today.  She states the redness has dried up some especially on the face 

she was able to squeeze some purulent material out of the wound on her left 

shoulder area she has not had any fevers.  She is also been started on 

dexamethasone and her blood sugars been 330 at home.  []





Review of Systems:


Review of Systems:


Constitutional:   Denies fever or chills. []


Integument:   Per HPI []


Endocrine:   Reports hyperglycemia []





Heart Score:


Risk Factors:


Risk Factors:  DM, Current or recent (<one month) smoker, HTN, HLP, family 

history of CAD, obesity.


Risk Scores:


Score 0 - 3:  2.5% MACE over next 6 weeks - Discharge Home


Score 4 - 6:  20.3% MACE over next 6 weeks - Admit for Clinical Observation


Score 7 - 10:  72.7% MACE over next 6 weeks - Early Invasive Strategies





Current Medications:





Current Medications








 Medications


  (Trade)  Dose


 Ordered  Sig/Kerwin  Start Time


 Stop Time Status Last Admin


Dose Admin


 


 Ceftriaxone Sodium


  (Rocephin)  1 gm  1X  ONCE  6/23/20 14:00


 6/23/20 14:01  6/23/20 13:40


1 GM











Allergies:


Allergies:





Allergies








Coded Allergies Type Severity Reaction Last Updated Verified


 


  lisinopril Allergy Severe ANGIOEDEMA 3/2/18 Yes


 


  Sulfa (Sulfonamide Antibiotics) Allergy Intermediate  3/2/18 Yes


 


  nickel Allergy Intermediate Rash 3/2/18 Yes


 


  piperacillin Allergy Intermediate HIVES 6/23/20 Yes


 


  strawberry Allergy Intermediate  3/2/18 Yes


 


  tazobactam Allergy Intermediate HIVES 3/2/18 Yes


 


  mupirocin Allergy Unknown UNKNOWN 6/22/20 Yes











Physical Exam:


PE:





Constitutional: Well developed, well nourished, no acute distress, non-toxic 

appearance. []


Skin: Some erythema on the left cheek in the same area as yesterday however it 

appears to be improving the wound on her left shoulder is all but dried up. [] 


Back: No tenderness, no CVA tenderness. [] 


Psychologic: Affect normal, judgement normal, mood normal. []





Current Patient Data:


Vital Signs:





                                   Vital Signs








  Date Time  Temp Pulse Resp B/P (MAP) Pulse Ox O2 Delivery O2 Flow Rate FiO2


 


6/23/20 13:20 98.6 78 16 133/81 (98) 99 Room Air  





 98.6       











EKG:


EKG:


[]





Radiology/Procedures:


Radiology/Procedures:


[]





Course & Med Decision Making:


Course & Med Decision Making


Pertinent Labs and Imaging studies reviewed. (See chart for details)





[]





Dragon Disclaimer:


Dragon Disclaimer:


This electronic medical record was generated, in whole or in part, using a voice

 recognition dictation system.





Departure


Departure


Impression:  


   Primary Impression:  


   Cat bite


   Qualified Codes:  W55.01XD - Bitten by cat, subsequent encounter


Disposition:  01 HOME, SELF-CARE


Condition:  STABLE


Referrals:  


BHARATH HART MD (PCP)


Patient Instructions:  Animal Bite





Additional Instructions:  


Continue the Augmentin, dexamethasone and Flagyl as directed





Justicifation of Admission Dx:


Justifications for Admission:


Justification of Admission Dx:  No











ELO CHAND DO             Jun 23, 2020 13:58

## 2020-09-21 ENCOUNTER — HOSPITAL ENCOUNTER (OUTPATIENT)
Dept: HOSPITAL 61 - LAB | Age: 40
Discharge: HOME | End: 2020-09-21
Attending: INTERNAL MEDICINE
Payer: COMMERCIAL

## 2020-09-21 DIAGNOSIS — E11.9: Primary | ICD-10-CM

## 2020-09-21 DIAGNOSIS — Z13.228: ICD-10-CM

## 2020-09-21 DIAGNOSIS — F41.9: ICD-10-CM

## 2020-09-21 LAB
CHOLEST SERPL-MCNC: 135 MG/DL (ref 0–200)
CHOLEST/HDLC SERPL: 2.5 {RATIO}
HDLC SERPL-MCNC: 53 MG/DL (ref 40–60)
LDLC: 50 MG/DL (ref 0–100)
TRIGL SERPL-MCNC: 160 MG/DL (ref 0–150)
VLDLC: 32 MG/DL (ref 0–40)

## 2020-09-21 PROCEDURE — 36415 COLL VENOUS BLD VENIPUNCTURE: CPT

## 2020-09-21 PROCEDURE — 84443 ASSAY THYROID STIM HORMONE: CPT

## 2020-09-21 PROCEDURE — 80061 LIPID PANEL: CPT

## 2020-09-21 PROCEDURE — 83036 HEMOGLOBIN GLYCOSYLATED A1C: CPT

## 2020-09-22 LAB — HBA1C MFR BLD: 6.2 % (ref 4.8–5.6)

## 2020-10-16 ENCOUNTER — HOSPITAL ENCOUNTER (OUTPATIENT)
Dept: HOSPITAL 61 - RAD | Age: 40
End: 2020-10-16
Attending: INTERNAL MEDICINE
Payer: COMMERCIAL

## 2020-10-16 DIAGNOSIS — R09.1: Primary | ICD-10-CM

## 2020-10-16 PROCEDURE — 71046 X-RAY EXAM CHEST 2 VIEWS: CPT

## 2020-10-16 NOTE — RAD
CHEST PA   LATERAL

 

History: Pleurisy

 

Comparison: June 5, 2017

 

Findings:

2 views of the chest are submitted. 

There is no infiltrate, pneumothorax, or effusion. Pericardial cardiac 

silhouette is within normal limits in size.

 

Impression: 

 

1.  There is no radiographic evidence of acute cardiopulmonary disease.

 

Electronically signed by: Rad Dickerson MD (10/16/2020 10:43 AM) 

[facility]

## 2020-12-14 ENCOUNTER — HOSPITAL ENCOUNTER (OUTPATIENT)
Dept: HOSPITAL 61 - LAB | Age: 40
End: 2020-12-14
Attending: INTERNAL MEDICINE
Payer: COMMERCIAL

## 2020-12-14 DIAGNOSIS — R09.81: ICD-10-CM

## 2020-12-14 DIAGNOSIS — Z20.828: ICD-10-CM

## 2020-12-14 DIAGNOSIS — R05: Primary | ICD-10-CM

## 2020-12-14 PROCEDURE — U0003 INFECTIOUS AGENT DETECTION BY NUCLEIC ACID (DNA OR RNA); SEVERE ACUTE RESPIRATORY SYNDROME CORONAVIRUS 2 (SARS-COV-2) (CORONAVIRUS DISEASE [COVID-19]), AMPLIFIED PROBE TECHNIQUE, MAKING USE OF HIGH THROUGHPUT TECHNOLOGIES AS DESCRIBED BY CMS-2020-01-R: HCPCS

## 2021-03-02 ENCOUNTER — HOSPITAL ENCOUNTER (EMERGENCY)
Dept: HOSPITAL 61 - ER | Age: 41
Discharge: HOME | End: 2021-03-02
Payer: COMMERCIAL

## 2021-03-02 VITALS — DIASTOLIC BLOOD PRESSURE: 77 MMHG | SYSTOLIC BLOOD PRESSURE: 118 MMHG

## 2021-03-02 VITALS — HEIGHT: 65 IN | WEIGHT: 150.13 LBS | BODY MASS INDEX: 25.01 KG/M2

## 2021-03-02 DIAGNOSIS — E11.9: ICD-10-CM

## 2021-03-02 DIAGNOSIS — Z98.890: ICD-10-CM

## 2021-03-02 DIAGNOSIS — I10: ICD-10-CM

## 2021-03-02 DIAGNOSIS — Z88.1: ICD-10-CM

## 2021-03-02 DIAGNOSIS — F17.200: ICD-10-CM

## 2021-03-02 DIAGNOSIS — R60.0: ICD-10-CM

## 2021-03-02 DIAGNOSIS — Z88.8: ICD-10-CM

## 2021-03-02 DIAGNOSIS — L03.115: Primary | ICD-10-CM

## 2021-03-02 DIAGNOSIS — Z88.2: ICD-10-CM

## 2021-03-02 DIAGNOSIS — Z91.018: ICD-10-CM

## 2021-03-02 LAB
ANION GAP SERPL CALC-SCNC: 14 MMOL/L (ref 6–14)
BASOPHILS # BLD AUTO: 0.1 X10^3/UL (ref 0–0.2)
BASOPHILS NFR BLD: 1 % (ref 0–3)
BUN SERPL-MCNC: 13 MG/DL (ref 7–20)
CALCIUM SERPL-MCNC: 8.9 MG/DL (ref 8.5–10.1)
CHLORIDE SERPL-SCNC: 100 MMOL/L (ref 98–107)
CO2 SERPL-SCNC: 24 MMOL/L (ref 21–32)
CREAT SERPL-MCNC: 0.9 MG/DL (ref 0.6–1)
EOSINOPHIL NFR BLD: 0.1 X10^3/UL (ref 0–0.7)
EOSINOPHIL NFR BLD: 1 % (ref 0–3)
ERYTHROCYTE [DISTWIDTH] IN BLOOD BY AUTOMATED COUNT: 11.7 % (ref 11.5–14.5)
GFR SERPLBLD BASED ON 1.73 SQ M-ARVRAT: 68.7 ML/MIN
GLUCOSE SERPL-MCNC: 152 MG/DL (ref 70–99)
HCT VFR BLD CALC: 38.3 % (ref 36–47)
HGB BLD-MCNC: 13.1 G/DL (ref 12–15.5)
LYMPHOCYTES # BLD: 1.9 X10^3/UL (ref 1–4.8)
LYMPHOCYTES NFR BLD AUTO: 24 % (ref 24–48)
MCH RBC QN AUTO: 30 PG (ref 25–35)
MCHC RBC AUTO-ENTMCNC: 34 G/DL (ref 31–37)
MCV RBC AUTO: 88 FL (ref 79–100)
MONO #: 0.4 X10^3/UL (ref 0–1.1)
MONOCYTES NFR BLD: 5 % (ref 0–9)
NEUT #: 5.4 X10^3/UL (ref 1.8–7.7)
NEUTROPHILS NFR BLD AUTO: 68 % (ref 31–73)
PLATELET # BLD AUTO: 270 X10^3/UL (ref 140–400)
POTASSIUM SERPL-SCNC: 3.9 MMOL/L (ref 3.5–5.1)
RBC # BLD AUTO: 4.34 X10^6/UL (ref 3.5–5.4)
SODIUM SERPL-SCNC: 138 MMOL/L (ref 136–145)
WBC # BLD AUTO: 7.9 X10^3/UL (ref 4–11)

## 2021-03-02 PROCEDURE — 96375 TX/PRO/DX INJ NEW DRUG ADDON: CPT

## 2021-03-02 PROCEDURE — 85025 COMPLETE CBC W/AUTO DIFF WBC: CPT

## 2021-03-02 PROCEDURE — 99284 EMERGENCY DEPT VISIT MOD MDM: CPT

## 2021-03-02 PROCEDURE — 86140 C-REACTIVE PROTEIN: CPT

## 2021-03-02 PROCEDURE — 96374 THER/PROPH/DIAG INJ IV PUSH: CPT

## 2021-03-02 PROCEDURE — 80048 BASIC METABOLIC PNL TOTAL CA: CPT

## 2021-03-02 PROCEDURE — 36415 COLL VENOUS BLD VENIPUNCTURE: CPT

## 2021-03-02 NOTE — PHYS DOC
Past Medical History


Past Medical History:  Diabetes-Type II, Hypertension, Other


Additional Past Medical Histor:  DERMATOSITIS


Past Surgical History:  Other


Additional Past Surgical Histo:  GASTRIC SLEEVE, D & C, WRIST SX


Smoking Status:  Former Smoker


Additional Information:  


QUIT SMOKING 2011


Alcohol Use:  Occasionally


Drug Use:  None





General Adult


EDM:


Chief Complaint:  CELLULITIS





HPI:


HPI:





Patient is a 40  year old female who presented to ER for evaluation of 48-hour 

history cellulitis on the area behind the back of her right knee.  Patient has 

history of dermatomyositis that she would require IV antibiotic.  Patient also 

has history of hypertension diabetic.  Patient says she may have been scratched 

by her dog on the back of her knee 2 days ago.  The area has been swelling and 

redness with some clear drainage fluid.  Her dog is up-to-date on her 

vaccination status.  Patient denies any chest pain, no trouble breathing.





Review of Systems:


Review of Systems:


Constitutional:   Denies fever or chills. []


Eyes:   Denies change in visual acuity. []


HENT:   Denies nasal congestion or sore throat. [] 


Respiratory:   Denies cough or shortness of breath. [] 


Cardiovascular:   Denies chest pain or edema. [] 


GI:   Denies abdominal pain, nausea, vomiting, bloody stools or diarrhea. [] 


:  Denies dysuria. [] 


Musculoskeletal:   Denies back pain or joint pain. [] 


Integument:   Positive for skin irritation on the back of her right knee.


Neurologic:   Denies headache, focal weakness or sensory changes. [] 


Endocrine:   Denies polyuria or polydipsia. [] 


Lymphatic:  Denies swollen glands. [] 


Psychiatric:  Denies depression or anxiety. []





Heart Score:


Risk Factors:


Risk Factors:  DM, Current or recent (<one month) smoker, HTN, HLP, family 

history of CAD, obesity.


Risk Scores:


Score 0 - 3:  2.5% MACE over next 6 weeks - Discharge Home


Score 4 - 6:  20.3% MACE over next 6 weeks - Admit for Clinical Observation


Score 7 - 10:  72.7% MACE over next 6 weeks - Early Invasive Strategies





Current Medications:





Current Medications








 Medications


  (Trade)  Dose


 Ordered  Sig/Kerwin  Start Time


 Stop Time Status Last Admin


Dose Admin


 


 Ceftriaxone Sodium


  (Rocephin)  1 gm  1X  ONCE  3/2/21 10:30


 3/2/21 10:31 DC 3/2/21 10:33


1 GM


 


 Methylprednisolone


 Sodium Succinate


  (SOLU-Medrol


 125MG VIAL)  125 mg  1X  ONCE  3/2/21 10:30


 3/2/21 10:31 DC 3/2/21 10:28


125 MG











Allergies:


Allergies:





Allergies








Coded Allergies Type Severity Reaction Last Updated Verified


 


  Sulfa (Sulfonamide Antibiotics) Allergy Severe sob 3/2/21 Yes


 


  lisinopril Allergy Severe ANGIOEDEMA 3/2/21 Yes


 


  strawberry Allergy Severe sob 3/2/21 Yes


 


  mupirocin Allergy Intermediate rash 3/2/21 Yes


 


  nickel Allergy Intermediate Rash 3/2/21 Yes


 


  piperacillin Allergy Intermediate HIVES 3/2/21 Yes


 


  tazobactam Allergy Intermediate HIVES 3/2/21 Yes











Physical Exam:


PE:





Constitutional: Well developed, well nourished, no acute distress, non-toxic 

appearance. []


HENT: Normocephalic, atraumatic, nose normal. []


Eyes: PERRLA, EOMI, conjunctiva normal, no discharge. [] 





Cardiovascular:Heart rate regular rhythm, no murmur []


Lungs & Thorax:  Bilateral breath sounds clear to auscultation []





Skin: Warm, there is erythema and streaking erythema on the back of her right 

knee with clear drainage.





Extremities: No tenderness, no cyanosis, no clubbing, ROM intact, no edema. 


Neurologic: Alert and oriented X 3, normal motor function, normal sensory 

function, no focal deficits noted. []


Psychologic: Affect normal, judgement normal, mood normal. []





Current Patient Data:


Labs:





                                Laboratory Tests








Test


 3/2/21


10:15


 


White Blood Count


 7.9 x10^3/uL


(4.0-11.0)


 


Red Blood Count


 4.34 x10^6/uL


(3.50-5.40)


 


Hemoglobin


 13.1 g/dL


(12.0-15.5)


 


Hematocrit


 38.3 %


(36.0-47.0)


 


Mean Corpuscular Volume


 88 fL ()





 


Mean Corpuscular Hemoglobin 30 pg (25-35)  


 


Mean Corpuscular Hemoglobin


Concent 34 g/dL


(31-37)


 


Red Cell Distribution Width


 11.7 %


(11.5-14.5)


 


Platelet Count


 270 x10^3/uL


(140-400)


 


Neutrophils (%) (Auto) 68 % (31-73)  


 


Lymphocytes (%) (Auto) 24 % (24-48)  


 


Monocytes (%) (Auto) 5 % (0-9)  


 


Eosinophils (%) (Auto) 1 % (0-3)  


 


Basophils (%) (Auto) 1 % (0-3)  


 


Neutrophils # (Auto)


 5.4 x10^3/uL


(1.8-7.7)


 


Lymphocytes # (Auto)


 1.9 x10^3/uL


(1.0-4.8)


 


Monocytes # (Auto)


 0.4 x10^3/uL


(0.0-1.1)


 


Eosinophils # (Auto)


 0.1 x10^3/uL


(0.0-0.7)


 


Basophils # (Auto)


 0.1 x10^3/uL


(0.0-0.2)


 


Sodium Level


 138 mmol/L


(136-145)


 


Potassium Level


 3.9 mmol/L


(3.5-5.1)


 


Chloride Level


 100 mmol/L


()


 


Carbon Dioxide Level


 24 mmol/L


(21-32)


 


Anion Gap 14 (6-14)  


 


Blood Urea Nitrogen


 13 mg/dL


(7-20)


 


Creatinine


 0.9 mg/dL


(0.6-1.0)


 


Estimated GFR


(Cockcroft-Gault) 68.7  





 


Glucose Level


 152 mg/dL


(70-99)  H


 


Calcium Level


 8.9 mg/dL


(8.5-10.1)


 


C-Reactive Protein,


Quantitative 7.6 mg/L


(0-3.3)  H





                                Laboratory Tests


3/2/21 10:15








                                Laboratory Tests


3/2/21 10:15








Vital Signs:





                                   Vital Signs








  Date Time  Temp Pulse Resp B/P (MAP) Pulse Ox O2 Delivery O2 Flow Rate FiO2


 


3/2/21 11:37  74 16 118/77 (91) 96 Room Air  


 


3/2/21 09:29 97.3       





 97.3       











EKG:


EKG:


[]





Radiology/Procedures:


Radiology/Procedures:


[]





Course & Med Decision Making:


Course & Med Decision Making


Pertinent Labs and Imaging studies reviewed. (See chart for details)





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 

to ER due to inflammation of the back of her right knee consistent with 

cellulitis after dog scratch.  Patient was given IV Rocephin in the ER,, patient

 was also given IV steroid in ER.  Patient was discharged home with prescription

 for doxycycline.  Patient is scheduled to see her family physician today at 3 

PM.





Dragon Disclaimer:


Dragon Disclaimer:


This electronic medical record was generated, in whole or in part, using a voice

 recognition dictation system.





Departure


Departure


Impression:  


   Primary Impression:  


   Cellulitis of right lower leg


Disposition:  01 DC HOME SELF CARE/HOMELESS


Condition:  STABLE


Referrals:  


BHARATH HART MD (PCP)


Please follow up with your doctor as scheduled as 3 pm  today


Patient Instructions:  Cellulitis





Additional Instructions:  


Thank you for visiting our Emergency Department. We appreciate you trusting us 

with your care. If any additional problems come up don't hesitate to return to 

visit us. Please follow up with your primary care provider so they can plan 

additional care if needed and know about the problem that you had. If symptoms 

worsen come back to the Emergency Department. Any concerning symptoms that start

 such as chest pain, shortness of air, weakness or numbness on one side of the 

body, running high fevers or any other concerning symptoms return to the ER.


Scripts


Methylprednisolone (MEDROL) 4 Mg Tablet


4 MG PO UD, #21 TAB


   Prov: DEVONTE CLEVELAND DO         3/2/21 


Doxycycline Hyclate (DOXYCYCLINE HYCLATE) 100 Mg Capsule


1 CAP PO BID, #20 CAP


   Prov: DEVONTE CLEVELAND DO         3/2/21











DEVONTE CLEVELAND DO                 Mar 2, 2021 11:51